# Patient Record
Sex: MALE | Race: WHITE | Employment: FULL TIME | URBAN - NONMETROPOLITAN AREA
[De-identification: names, ages, dates, MRNs, and addresses within clinical notes are randomized per-mention and may not be internally consistent; named-entity substitution may affect disease eponyms.]

---

## 2018-10-08 ENCOUNTER — OFFICE VISIT (OUTPATIENT)
Dept: PRIMARY CARE CLINIC | Age: 28
End: 2018-10-08
Payer: COMMERCIAL

## 2018-10-08 VITALS
DIASTOLIC BLOOD PRESSURE: 60 MMHG | BODY MASS INDEX: 19.59 KG/M2 | SYSTOLIC BLOOD PRESSURE: 102 MMHG | HEART RATE: 50 BPM | WEIGHT: 144.6 LBS | HEIGHT: 72 IN | TEMPERATURE: 97.6 F | OXYGEN SATURATION: 99 %

## 2018-10-08 DIAGNOSIS — J02.9 SORE THROAT: Primary | ICD-10-CM

## 2018-10-08 LAB — S PYO AG THROAT QL: NORMAL

## 2018-10-08 PROCEDURE — G8484 FLU IMMUNIZE NO ADMIN: HCPCS | Performed by: NURSE PRACTITIONER

## 2018-10-08 PROCEDURE — 99203 OFFICE O/P NEW LOW 30 MIN: CPT | Performed by: NURSE PRACTITIONER

## 2018-10-08 PROCEDURE — 87880 STREP A ASSAY W/OPTIC: CPT | Performed by: NURSE PRACTITIONER

## 2018-10-08 PROCEDURE — G8420 CALC BMI NORM PARAMETERS: HCPCS | Performed by: NURSE PRACTITIONER

## 2018-10-08 PROCEDURE — G8427 DOCREV CUR MEDS BY ELIG CLIN: HCPCS | Performed by: NURSE PRACTITIONER

## 2018-10-08 PROCEDURE — 1036F TOBACCO NON-USER: CPT | Performed by: NURSE PRACTITIONER

## 2018-10-08 RX ORDER — CETIRIZINE HYDROCHLORIDE 10 MG/1
10 TABLET ORAL DAILY
COMMUNITY

## 2018-10-08 ASSESSMENT — ENCOUNTER SYMPTOMS
GASTROINTESTINAL NEGATIVE: 1
COUGH: 1

## 2018-10-08 ASSESSMENT — PATIENT HEALTH QUESTIONNAIRE - PHQ9
SUM OF ALL RESPONSES TO PHQ QUESTIONS 1-9: 0
SUM OF ALL RESPONSES TO PHQ9 QUESTIONS 1 & 2: 0
1. LITTLE INTEREST OR PLEASURE IN DOING THINGS: 0
2. FEELING DOWN, DEPRESSED OR HOPELESS: 0
SUM OF ALL RESPONSES TO PHQ QUESTIONS 1-9: 0

## 2019-02-06 ENCOUNTER — OFFICE VISIT (OUTPATIENT)
Dept: FAMILY MEDICINE CLINIC | Age: 29
End: 2019-02-06
Payer: COMMERCIAL

## 2019-02-06 VITALS
WEIGHT: 148.8 LBS | TEMPERATURE: 97.3 F | HEART RATE: 58 BPM | HEIGHT: 72 IN | OXYGEN SATURATION: 98 % | RESPIRATION RATE: 16 BRPM | DIASTOLIC BLOOD PRESSURE: 56 MMHG | BODY MASS INDEX: 20.15 KG/M2 | SYSTOLIC BLOOD PRESSURE: 110 MMHG

## 2019-02-06 DIAGNOSIS — Z23 NEED FOR TDAP VACCINATION: ICD-10-CM

## 2019-02-06 DIAGNOSIS — Z76.89 ENCOUNTER TO ESTABLISH CARE: Primary | ICD-10-CM

## 2019-02-06 DIAGNOSIS — K58.0 IRRITABLE BOWEL SYNDROME WITH DIARRHEA: ICD-10-CM

## 2019-02-06 DIAGNOSIS — Z13.0 SCREENING FOR DEFICIENCY ANEMIA: ICD-10-CM

## 2019-02-06 DIAGNOSIS — R53.83 FATIGUE, UNSPECIFIED TYPE: ICD-10-CM

## 2019-02-06 LAB
BASOPHILS # BLD: 0.1 THOU/MM3
DIFFERENTIAL: AUTOMATED DIFF
EOSINOPHIL # BLD: 0.15 THOU/MM3
HCT VFR BLD CALC: 44.5 %
HEMOGLOBIN: 14.9 G/DL
LYMPHOCYTES # BLD: 2.67 THOU/MM3
MCH RBC QN AUTO: 30.1 PG
MCHC RBC AUTO-ENTMCNC: 33.4 G/DL
MCV RBC AUTO: 90.2 FL
MONOCYTES # BLD: 0.58 THOU/MM3
NEUTROPHILS: 3.73 THOU/MM3
PDW BLD-RTO: 10.9 %
PLATELET # BLD: 238 THOU/MM3
PMV BLD AUTO: 7.4 FL
RBC # BLD: 4.94 M/UL
TSH SERPL DL<=0.05 MIU/L-ACNC: 0.42 MIU/ML
VITAMIN D2, 25 HYDROXY: 40 NG/ML
WBC # BLD: 7.24 THOU/ML3

## 2019-02-06 PROCEDURE — 1036F TOBACCO NON-USER: CPT | Performed by: NURSE PRACTITIONER

## 2019-02-06 PROCEDURE — 90715 TDAP VACCINE 7 YRS/> IM: CPT | Performed by: NURSE PRACTITIONER

## 2019-02-06 PROCEDURE — 90471 IMMUNIZATION ADMIN: CPT | Performed by: NURSE PRACTITIONER

## 2019-02-06 PROCEDURE — G8484 FLU IMMUNIZE NO ADMIN: HCPCS | Performed by: NURSE PRACTITIONER

## 2019-02-06 PROCEDURE — G8420 CALC BMI NORM PARAMETERS: HCPCS | Performed by: NURSE PRACTITIONER

## 2019-02-06 PROCEDURE — 99204 OFFICE O/P NEW MOD 45 MIN: CPT | Performed by: NURSE PRACTITIONER

## 2019-02-06 PROCEDURE — G8427 DOCREV CUR MEDS BY ELIG CLIN: HCPCS | Performed by: NURSE PRACTITIONER

## 2019-02-06 RX ORDER — DICYCLOMINE HCL 20 MG
20 TABLET ORAL 4 TIMES DAILY
Qty: 120 TABLET | Refills: 1 | Status: SHIPPED | OUTPATIENT
Start: 2019-02-06 | End: 2019-05-14 | Stop reason: SDUPTHER

## 2019-02-06 ASSESSMENT — ENCOUNTER SYMPTOMS
DIARRHEA: 1
BLOOD IN STOOL: 0
CONSTIPATION: 0
VOMITING: 1
ABDOMINAL PAIN: 1
NAUSEA: 1

## 2019-02-06 ASSESSMENT — PATIENT HEALTH QUESTIONNAIRE - PHQ9
SUM OF ALL RESPONSES TO PHQ QUESTIONS 1-9: 0
SUM OF ALL RESPONSES TO PHQ QUESTIONS 1-9: 0
2. FEELING DOWN, DEPRESSED OR HOPELESS: 0
1. LITTLE INTEREST OR PLEASURE IN DOING THINGS: 0
SUM OF ALL RESPONSES TO PHQ9 QUESTIONS 1 & 2: 0

## 2019-02-07 ENCOUNTER — TELEPHONE (OUTPATIENT)
Dept: FAMILY MEDICINE CLINIC | Age: 29
End: 2019-02-07

## 2019-02-17 ASSESSMENT — ENCOUNTER SYMPTOMS
WHEEZING: 0
SHORTNESS OF BREATH: 0
COUGH: 0

## 2019-04-30 DIAGNOSIS — R79.89 ABNORMAL TSH: ICD-10-CM

## 2019-04-30 DIAGNOSIS — R53.83 FATIGUE, UNSPECIFIED TYPE: Primary | ICD-10-CM

## 2019-05-03 LAB
T3 TOTAL: NORMAL
T4 FREE: 1.13 NG/DL (ref 0.78–2.1)
TSH SERPL DL<=0.05 MIU/L-ACNC: 1.24 MIU/ML (ref 0.49–4.6)

## 2019-05-14 DIAGNOSIS — K58.0 IRRITABLE BOWEL SYNDROME WITH DIARRHEA: ICD-10-CM

## 2019-05-14 RX ORDER — DICYCLOMINE HCL 20 MG
20 TABLET ORAL 4 TIMES DAILY
Qty: 120 TABLET | Refills: 1 | Status: SHIPPED | OUTPATIENT
Start: 2019-05-14 | End: 2019-07-05 | Stop reason: SDUPTHER

## 2019-06-03 ENCOUNTER — HOSPITAL ENCOUNTER (OUTPATIENT)
Age: 29
Setting detail: SPECIMEN
Discharge: HOME | End: 2019-06-03
Payer: COMMERCIAL

## 2019-06-06 LAB — SURGICAL PATHOLOGY REPORT: NORMAL

## 2019-07-05 DIAGNOSIS — K58.0 IRRITABLE BOWEL SYNDROME WITH DIARRHEA: ICD-10-CM

## 2019-07-05 NOTE — TELEPHONE ENCOUNTER
Angela Thornton is requesting a refill on the following medication(s):  Requested Prescriptions     Pending Prescriptions Disp Refills    dicyclomine (BENTYL) 20 MG tablet 120 tablet 1     Sig: Take 1 tablet by mouth 4 times daily       Last Visit Date (If Applicable):  5/2/7411    Next Visit Date:    Visit date not found

## 2019-07-07 RX ORDER — DICYCLOMINE HCL 20 MG
20 TABLET ORAL 4 TIMES DAILY
Qty: 120 TABLET | Refills: 1 | Status: SHIPPED | OUTPATIENT
Start: 2019-07-07 | End: 2021-11-11

## 2019-09-24 ENCOUNTER — OFFICE VISIT (OUTPATIENT)
Dept: FAMILY MEDICINE CLINIC | Age: 29
End: 2019-09-24
Payer: COMMERCIAL

## 2019-09-24 VITALS
OXYGEN SATURATION: 98 % | BODY MASS INDEX: 21.43 KG/M2 | SYSTOLIC BLOOD PRESSURE: 118 MMHG | HEART RATE: 70 BPM | DIASTOLIC BLOOD PRESSURE: 80 MMHG | WEIGHT: 158 LBS

## 2019-09-24 DIAGNOSIS — M25.562 ACUTE PAIN OF BOTH KNEES: Primary | ICD-10-CM

## 2019-09-24 DIAGNOSIS — K58.0 IRRITABLE BOWEL SYNDROME WITH DIARRHEA: ICD-10-CM

## 2019-09-24 DIAGNOSIS — R21 RASH OF FACE: ICD-10-CM

## 2019-09-24 DIAGNOSIS — M25.561 ACUTE PAIN OF BOTH KNEES: Primary | ICD-10-CM

## 2019-09-24 DIAGNOSIS — R53.83 FATIGUE, UNSPECIFIED TYPE: ICD-10-CM

## 2019-09-24 PROCEDURE — 99214 OFFICE O/P EST MOD 30 MIN: CPT | Performed by: NURSE PRACTITIONER

## 2019-09-24 PROCEDURE — 1036F TOBACCO NON-USER: CPT | Performed by: NURSE PRACTITIONER

## 2019-09-24 PROCEDURE — G8427 DOCREV CUR MEDS BY ELIG CLIN: HCPCS | Performed by: NURSE PRACTITIONER

## 2019-09-24 PROCEDURE — G8420 CALC BMI NORM PARAMETERS: HCPCS | Performed by: NURSE PRACTITIONER

## 2019-09-24 RX ORDER — CHOLESTYRAMINE 4 G/9G
POWDER, FOR SUSPENSION ORAL
Refills: 0 | COMMUNITY
Start: 2019-07-29 | End: 2020-10-13

## 2019-09-24 NOTE — PROGRESS NOTES
[address] [address] [address] [facility]  Dept: [phone]  Dept Fax: [phone]    Miesha Tamayo is a 34 y.o. male who presents today for his medical conditions/complaints as noted below. Miesha Tamayo c/o of Fatigue (extreme fatigue-x2 wks has gotten worse within the last week); Dizziness (lightheadness/lethargic); and Loss of Consciousness (x2 wks ago)      HPI:   Patient presents to the office with complaints of a rash on his nose and cheeks. He is also experiencing bilateral knee pain, without injury. He had dizziness with a syncopal episode 2 weeks ago while going to the bathroom. HR drops to 38 -42 when sleeping, on Apple watch. Knee Pain    The incident occurred more than 1 week ago (2 weeks). There was no injury mechanism. The pain is present in the left knee and right knee. The quality of the pain is described as aching. The pain is at a severity of 5/10. The pain is mild. The pain has been constant since onset. Pertinent negatives include no inability to bear weight, loss of motion, loss of sensation, muscle weakness, numbness or tingling. The symptoms are aggravated by movement. He has tried NSAIDs for the symptoms. The treatment provided no relief. Rash   This is a new problem. The current episode started 1 to 4 weeks ago. The problem has been waxing and waning since onset. The affected locations include the face. The rash is characterized by redness. He was exposed to nothing. Associated symptoms include diarrhea (IBS) and fatigue. Pertinent negatives include no congestion, cough, fever, rhinorrhea, shortness of breath or sore throat. Past treatments include nothing.        BP Readings from Last 3 Encounters:   09/24/19 118/80   02/06/19 (!) 110/56   10/08/18 102/60          ([id number])    Wt Readings from Last 3 Encounters:   09/24/19 158 lb (71.7 kg)   02/06/19 148 lb 12.8 oz (67.5 kg)   10/08/18 144 lb 9.6 oz (65.6 kg)       Past

## 2019-09-27 DIAGNOSIS — M25.561 ACUTE PAIN OF BOTH KNEES: ICD-10-CM

## 2019-09-27 DIAGNOSIS — R53.83 FATIGUE, UNSPECIFIED TYPE: ICD-10-CM

## 2019-09-27 DIAGNOSIS — M25.562 ACUTE PAIN OF BOTH KNEES: ICD-10-CM

## 2019-09-27 DIAGNOSIS — R21 RASH OF FACE: ICD-10-CM

## 2019-09-29 ASSESSMENT — ENCOUNTER SYMPTOMS
RHINORRHEA: 0
SHORTNESS OF BREATH: 0
ABDOMINAL PAIN: 0
COUGH: 0
CONSTIPATION: 1
SINUS PRESSURE: 0
SORE THROAT: 0
WHEEZING: 0
EYES NEGATIVE: 1
DIARRHEA: 1

## 2019-10-02 DIAGNOSIS — R53.83 FATIGUE, UNSPECIFIED TYPE: ICD-10-CM

## 2019-10-02 DIAGNOSIS — M25.562 ACUTE PAIN OF BOTH KNEES: Primary | ICD-10-CM

## 2019-10-02 DIAGNOSIS — M25.562 ACUTE PAIN OF BOTH KNEES: ICD-10-CM

## 2019-10-02 DIAGNOSIS — R21 RASH OF FACE: ICD-10-CM

## 2019-10-02 DIAGNOSIS — M25.561 ACUTE PAIN OF BOTH KNEES: Primary | ICD-10-CM

## 2019-10-02 DIAGNOSIS — M25.561 ACUTE PAIN OF BOTH KNEES: ICD-10-CM

## 2019-10-02 RX ORDER — NAPROXEN 500 MG/1
500 TABLET ORAL 2 TIMES DAILY WITH MEALS
Qty: 30 TABLET | Refills: 1 | Status: SHIPPED | OUTPATIENT
Start: 2019-10-02 | End: 2022-08-08

## 2020-10-13 ENCOUNTER — OFFICE VISIT (OUTPATIENT)
Dept: FAMILY MEDICINE CLINIC | Age: 30
End: 2020-10-13
Payer: COMMERCIAL

## 2020-10-13 VITALS
HEART RATE: 85 BPM | RESPIRATION RATE: 16 BRPM | WEIGHT: 154 LBS | DIASTOLIC BLOOD PRESSURE: 80 MMHG | TEMPERATURE: 97.5 F | OXYGEN SATURATION: 96 % | SYSTOLIC BLOOD PRESSURE: 118 MMHG | BODY MASS INDEX: 20.89 KG/M2

## 2020-10-13 PROCEDURE — G8427 DOCREV CUR MEDS BY ELIG CLIN: HCPCS | Performed by: NURSE PRACTITIONER

## 2020-10-13 PROCEDURE — G8484 FLU IMMUNIZE NO ADMIN: HCPCS | Performed by: NURSE PRACTITIONER

## 2020-10-13 PROCEDURE — G8420 CALC BMI NORM PARAMETERS: HCPCS | Performed by: NURSE PRACTITIONER

## 2020-10-13 PROCEDURE — 99214 OFFICE O/P EST MOD 30 MIN: CPT | Performed by: NURSE PRACTITIONER

## 2020-10-13 PROCEDURE — 1036F TOBACCO NON-USER: CPT | Performed by: NURSE PRACTITIONER

## 2020-10-13 RX ORDER — METRONIDAZOLE 7.5 MG/G
GEL TOPICAL
Qty: 60 G | Refills: 0 | Status: SHIPPED | OUTPATIENT
Start: 2020-10-13 | End: 2020-10-20 | Stop reason: ALTCHOICE

## 2020-10-13 ASSESSMENT — PATIENT HEALTH QUESTIONNAIRE - PHQ9
SUM OF ALL RESPONSES TO PHQ QUESTIONS 1-9: 0
2. FEELING DOWN, DEPRESSED OR HOPELESS: 0
SUM OF ALL RESPONSES TO PHQ QUESTIONS 1-9: 0
1. LITTLE INTEREST OR PLEASURE IN DOING THINGS: 0
SUM OF ALL RESPONSES TO PHQ9 QUESTIONS 1 & 2: 0

## 2020-10-13 ASSESSMENT — ENCOUNTER SYMPTOMS
CHANGE IN BOWEL HABIT: 1
ABDOMINAL PAIN: 1
ANAL BLEEDING: 0
NAUSEA: 1
VOMITING: 0

## 2020-10-13 NOTE — PROGRESS NOTES
[address] [place] [address] [address], [facility]  [address]  Phone:  [phone]  Fax:  [phone]  Jesse Serrato is a 27 y.o. male who presents today for his medical conditions/complaints as noted below. Jesse Serrato c/o of Lesion(s) (chest/abdomen past 3-4 days)      HPI:     Other   This is a recurrent problem. The current episode started in the past 7 days (Has been having issues for several years now. Will be fine for months and then has flares. ). The problem has been gradually worsening. Associated symptoms include abdominal pain (substernal), anorexia, arthralgias (hand joints and knees), a change in bowel habit (diarrhea), fatigue, nausea and a rash (facial). Pertinent negatives include no chills, fever or vomiting. Nothing aggravates the symptoms. Treatments tried: viberzi - caused too much constipation, bentyl. The treatment provided no relief. Wt Readings from Last 3 Encounters:   10/13/20 154 lb (69.9 kg)   09/24/19 158 lb (71.7 kg)   02/06/19 148 lb 12.8 oz (67.5 kg)       Temp Readings from Last 3 Encounters:   10/13/20 97.5 °F (36.4 °C)   02/06/19 97.3 °F (36.3 °C) (Tympanic)   10/08/18 97.6 °F (36.4 °C) (Tympanic)       BP Readings from Last 3 Encounters:   10/13/20 118/80   09/24/19 118/80   02/06/19 (!) 110/56       Pulse Readings from Last 3 Encounters:   10/13/20 85   09/24/19 70   02/06/19 58              Past Medical History:   Diagnosis Date    Irritable bowel syndrome     GI doctor in [place] was treating IBS      History reviewed. No pertinent surgical history.   Family History   Problem Relation Age of Onset   [de-identified] Cancer Father         brain cancer in remission ([id number])    Other Father         ruptured gall bladder had removed    Diabetes Maternal Grandfather     Heart Attack Paternal Grandfather 80     Social History     Tobacco Use    Smoking status: Never Smoker    Smokeless tobacco: Never Used   Substance Use Topics    Alcohol use: Yes     Comment: occasional      Current Outpatient Medications   Medication Sig Dispense Refill    metroNIDAZOLE (METROGEL) 0.75 % gel Apply topically 2 times daily. 60 g 0    naproxen (NAPROSYN) 500 MG tablet Take 1 tablet by mouth 2 times daily (with meals) 30 tablet 1    dicyclomine (BENTYL) 20 MG tablet Take 1 tablet by mouth 4 times daily 120 tablet 1    cetirizine (ZYRTEC) 10 MG tablet Take 10 mg by mouth daily      Budesonide (RHINOCORT ALLERGY NA) 1 spray by Nasal route 2 times daily       No current facility-administered medications for this visit. No Known Allergies    No exam data present    Subjective:      Review of Systems   Constitutional: Positive for fatigue. Negative for chills and fever. Gastrointestinal: Positive for abdominal pain (substernal), anorexia, change in bowel habit (diarrhea) and nausea. Negative for anal bleeding and vomiting. Musculoskeletal: Positive for arthralgias (hand joints and knees). Skin: Positive for rash (facial). Psychiatric/Behavioral:        Complains of \"brain fog\"         Objective:     /80 (Site: Right Upper Arm, Position: Sitting, Cuff Size: Medium Adult)   Pulse 85   Temp 97.5 °F (36.4 °C)   Resp 16   Wt 154 lb (69.9 kg)   SpO2 96%   BMI 20.89 kg/m²     Physical Exam  Vitals signs reviewed. Constitutional:       General: He is not in acute distress. Appearance: He is well-developed. He is not ill-appearing, toxic-appearing or diaphoretic. HENT:      Head: Normocephalic. Right Ear: Tympanic membrane, ear canal and external ear normal.      Left Ear: Tympanic membrane, ear canal and external ear normal.      Nose: Nose normal. No mucosal edema, congestion or rhinorrhea. Mouth/Throat:      Mouth: Mucous membranes are moist. Mucous membranes are not pale and not dry. Pharynx: Oropharynx is clear. Eyes:      General: Lids are normal. No scleral icterus. Right eye: No discharge.          Left eye: No discharge. Extraocular Movements:      Right eye: No nystagmus. Left eye: No nystagmus. Conjunctiva/sclera: Conjunctivae normal.      Pupils: Pupils are equal, round, and reactive to light. Neck:      Musculoskeletal: Full passive range of motion without pain and normal range of motion. Trachea: Trachea normal.   Cardiovascular:      Rate and Rhythm: Normal rate and regular rhythm. Heart sounds: Normal heart sounds. Pulmonary:      Effort: Pulmonary effort is normal. No accessory muscle usage or respiratory distress. Breath sounds: Normal breath sounds. Abdominal:      General: Abdomen is flat. Bowel sounds are normal.      Tenderness: There is abdominal tenderness. Musculoskeletal: Normal range of motion. Skin:     General: Skin is warm and dry. Capillary Refill: Capillary refill takes less than 2 seconds. Coloration: Skin is not pale. Findings: Rash present. Neurological:      Mental Status: He is alert and oriented to person, place, and time. Psychiatric:         Mood and Affect: Mood normal.         Speech: Speech normal.         Behavior: Behavior normal. Behavior is cooperative. Thought Content: Thought content normal.         Judgment: Judgment normal.         Assessment:      Diagnosis Orders   1. Gastric pain  Sedimentation Rate    C-Reactive Protein    Celiac Disease Panel    CBC With Auto Differential   2. Arthralgia of both hands  Sedimentation Rate    C-Reactive Protein    Celiac Disease Panel    CBC With Auto Differential   3. Rosacea  metroNIDAZOLE (METROGEL) 0.75 % gel     No results found for this visit on 10/13/20. Plan:         Eat high wheat diet for 2 days and then have labs done. Start the Metrogel. If too expensive check if the cream is cheaper. If so I can change the prescription. I will call you with results.         Patient Instructions     Eat high wheat diet for 2 days and then have labs done.  Start the Metrogel. If too expensive check if the cream is cheaper. If so I can change the prescription. I will call you with results. Patient Education        Rosacea: Care Instructions  Your Care Instructions  Rosacea (say \"Martin\") is a skin condition that can cause redness, pimples, and red lines on the nose, cheeks, chin, and forehead. It is often mistaken for acne because it can cause outbreaks with bumps like pimples. Rosacea can also cause burning and soreness in your eyes. Rosacea is usually controlled by using medicine and avoiding alcohol, the sun, and other things that can make rosacea worse. Your doctor may have prescribed medicines or other treatment. If antibiotics do not control the rosacea, your doctor may try other medicines. Follow-up care is a key part of your treatment and safety. Be sure to make and go to all appointments, and call your doctor if you are having problems. It's also a good idea to know your test results and keep a list of the medicines you take. How can you care for yourself at home? · Take your medicines exactly as prescribed. Call your doctor if you think you are having a problem with your medicine. · Protect your face from the sun by wearing hats with wide brims and sunglasses. Try to stay out of the sun or find shade if you need to be outdoors. Use a sunscreen for sensitive skin with an SPF of 30 or higher on any exposed skin. · Use soaps, lotions, and makeup made for sensitive skin or rosacea. These do not contain alcohol, are not abrasive, and will not clog pores. · There are over-the-counter skin care products available that are specifically for people with rosacea. These products can help mask facial redness without irritating your skin. · Avoid rubbing or scrubbing your face. · If you have rosacea on your eyelids, put a warm, wet towel, or compress, on your eyes several times a day.  Gently wash your eyelids with a washcloth or an eyelid cleanser that is sold in drugstores. Use artificial tears if your eyes feel dry. · Make a list or keep a diary of things that may trigger your rosacea. Use the diary every day for several weeks. Avoid whatever you find that makes your rosacea worse. These triggers may include:  ? Harsh weather. Wear a hat and scarf to shield your face from the cold and wind. Use a moisturizer during the winter to keep your face moist.  ? Stress. Eat a healthy diet and get plenty of exercise and sleep. ? Alcohol, spicy foods, or hot drinks. Avoid or limit these if they make your rosacea worse. ? Getting too hot when you exercise. Try working out for a shorter time. In the summer, exercise during the cool morning hours. ? Hot showers. Take warm or cool showers and avoid hot tubs and saunas. When should you call for help? Watch closely for changes in your health, and be sure to contact your doctor if:    · You do not get better as expected. Where can you learn more? Go to https://MD Revolution.[employer]. org and sign in to your Dot VN account. Enter B905 in the Verbling box to learn more about \"Rosacea: Care Instructions. \"     If you do not have an account, please click on the \"Sign Up Now\" link. Current as of: July 2, 2020               Content Version: 12.6  © 3519-4895 [employer]. Care instructions adapted under license by [facility] ([facility]). If you have questions about a medical condition or this instruction, always ask your healthcare professional. [address] [address] any warranty or liability for your use of this information. Patient Education        Celiac Disease: Care Instructions  Your Care Instructions  Celiac disease (or celiac sprue) is a problem with digesting gluten. Gluten is a type of protein found in wheat, rye, and other grains. This problem starts when the body's immune system attacks the small intestine when gluten is eaten.  The immune system is supposed to fight off viruses and other invaders, but sometimes it turns on the person's own body. (This is called an autoimmune disease.) Celiac disease seems to run in families. Celiac disease causes damage to the small intestine. This makes it hard for the body to absorb vitamins and other nutrients. You cannot prevent celiac disease. But you can stop and reverse the damage to the small intestine by eating a strict gluten-free diet. Follow-up care is a key part of your treatment and safety. Be sure to make and go to all appointments, and call your doctor if you are having problems. It's also a good idea to know your test results and keep a list of the medicines you take. How can you care for yourself at home? · Eat a gluten-free diet to prevent symptoms and damage to the small intestine. Even a small amount of gluten may cause damage. ? Avoid all foods that contain wheat, rye, and barley gluten. Bread, bagels, pasta, pizza, malted breakfast cereals, and crackers are all examples of foods that contain gluten. ? Avoid oats, at least at first. Oats may cause symptoms in some people. The oats may be contaminated with wheat, barley, or rye from processing. But many people who have celiac disease can eat moderate amounts of oats without having symptoms. Health professionals vary in their long-term recommendations regarding eating foods with oats. But most agree it is safe to eat oats labeled as gluten-free. · You may need to avoid milk and milk products for a while. Once you stop eating any gluten, the intestine will begin to heal. Then it should be okay to drink milk and eat milk products. · Read food labels carefully and look for hidden gluten, such as gluten in medicine and some food additives. If a label says \"modified food starch,\" the product may contain gluten. · Plan your diet around:  ? Eggs. ? Dairy products, if you can eat them.  Cheese, yogurt, and other dairy products can be an important part of the diet. ? Flours and foods made with amaranth, arrowroot, beans, buckwheat, corn, cornmeal, flax, millet, potatoes, gluten-free nut and oat bran, quinoa, rice, sorghum, soybeans, tapioca, or teff. ? Fresh, frozen, and canned meats, fruits, and vegetables. Watch for added gluten. · Talk to your doctor or contact your local hospital or dietitian for information about support groups in your area. You may find a support group helpful for discovering ways to help you deal with celiac disease. Celiac disease support groups often share recipes and good food sources. · Look for gluten-free foods. Many food stores, especially health food stores, offer specially marked gluten-free food. When should you call for help? Watch closely for changes in your health, and be sure to contact your doctor if:    · Your bloating, gas, and diarrhea get worse.     · You have bloating, gas, and diarrhea after not having them for a while. Where can you learn more? Go to https://MobibasepeCRAZE.Genoom. org and sign in to your Everlane account. Enter [id number] in the [facility] box to learn more about \"Celiac Disease: Care Instructions. \"     If you do not have an account, please click on the \"Sign Up Now\" link. Current as of: April 15, 2020               Content Version: 12.6  © 2006-2020 [employer]. Care instructions adapted under license by [facility] ([facility]). If you have questions about a medical condition or this instruction, always ask your healthcare professional. [address] [address] any warranty or liability for your use of this information. Patient Education        Gluten-Free Diet: Care Instructions  Your Care Instructions     To help your symptoms, your doctor has recommended a gluten-free diet. This means not eating foods that have gluten in them. Gluten is a kind of protein. It's found in wheat, barley, and rye.   If you eat a gluten-free diet, you can help manage your symptoms and prevent long-term problems. You can also get all the nutrition you need. Follow-up care is a key part of your treatment and safety. Be sure to make and go to all appointments, and call your doctor if you are having problems. It's also a good idea to know your test results and keep a list of the medicines you take. How can you care for yourself at home? · Don't eat any foods that have gluten in them. These include bagels, bread, crackers, and some cereals. They also include pasta and pizza. · Carefully read food labels. Look for wheat or wheat products in ice cream and candy. You may also find them in salad dressing, canned and frozen soups and vegetables, and other processed foods. · Avoid all beer products unless the label says they are gluten-free. Beers with and without alcohol have gluten unless the labels say they are gluten-free. This includes lagers, ales, and stouts. · Avoid oats, at least at first. Oats may cause symptoms in some people, perhaps as a result of contamination with wheat, barley, or rye during processing. But many people who have celiac disease can eat moderate amounts of oats without having symptoms. Health professionals vary in their long-term recommendations regarding eating foods with oats. But most agree it is safe to eat oats labeled as gluten-free. · When you eat out, look for restaurants that serve gluten-free food. You can also ask if the  is familiar with gluten-free cooking. · Try to learn more about gluten-free options. Find grocery stores that sell gluten-free pizza and other foods. If you have access to the Internet, look online for gluten-free foods and recipes. · On a gluten-free eating plan, it's okay to have:  ? Eggs and dairy products. (But some dairy products may make your symptoms worse. Ask your doctor if you have questions about dairy products. Read ingredient labels carefully. Some processed cheeses contain gluten.)  ?  Flours and foods made with amaranth, arrowroot, beans, buckwheat, corn, cornmeal, flax, millet, potatoes, gluten-free nut and oat bran, quinoa, rice, sorghum, soybeans, tapioca, or teff. ? Fresh, frozen, or canned unprocessed meats. But avoid processed meats. Some examples of processed meats to avoid are hot dogs, salami, and deli meat. Read labels for additives that may contain gluten. ? Fresh, frozen, dried, or canned fruits and vegetables, if they do not have thickeners or other additives that contain gluten. ? Some alcohol drinks. These include wine, liqueurs, and ciders. They also include liquor like whiskey and letitia. When should you call for help? Watch closely for changes in your health, and be sure to contact your doctor if:    · You have unexplained weight loss.     · You have diarrhea that lasts longer than 1 to 2 weeks.     · You have unusual fatigue or mood changes, especially if these last more than a week and are not related to any other illness, such as the flu.     · Your symptoms come back again.     · Your stomach pain gets worse. Where can you learn more? Go to https://Pro Player Connect.eTukTuk. org and sign in to your Visual Realm account. Enter [phone] in the [facility] box to learn more about \"Gluten-Free Diet: Care Instructions. \"     If you do not have an account, please click on the \"Sign Up Now\" link. Current as of: August 22, 2019               Content Version: 12.6  © 6020-9001 [employer]. Care instructions adapted under license by [facility] ([facility]). If you have questions about a medical condition or this instruction, always ask your healthcare professional. [address] [address] any warranty or liability for your use of this information. Patient/Caregiver instructed on use, benefit, and side effects of prescribed medications. All patient/parent/caregiver questions answered. Patient/parent/caregiver voiced understanding. Reviewed health maintenance.   Instructed to continue current medications, diet and exercise. Patient agreed with treatment plan. Follow up as directed.            Electronically signed by VINCE Mathews NP on10/13/2020

## 2020-10-13 NOTE — PATIENT INSTRUCTIONS
Eat high wheat diet for 2 days and then have labs done. Start the Metrogel. If too expensive check if the cream is cheaper. If so I can change the prescription. I will call you with results. Patient Education        Rosacea: Care Instructions  Your Care Instructions  Rosacea (say \"Martin\") is a skin condition that can cause redness, pimples, and red lines on the nose, cheeks, chin, and forehead. It is often mistaken for acne because it can cause outbreaks with bumps like pimples. Rosacea can also cause burning and soreness in your eyes. Rosacea is usually controlled by using medicine and avoiding alcohol, the sun, and other things that can make rosacea worse. Your doctor may have prescribed medicines or other treatment. If antibiotics do not control the rosacea, your doctor may try other medicines. Follow-up care is a key part of your treatment and safety. Be sure to make and go to all appointments, and call your doctor if you are having problems. It's also a good idea to know your test results and keep a list of the medicines you take. How can you care for yourself at home? · Take your medicines exactly as prescribed. Call your doctor if you think you are having a problem with your medicine. · Protect your face from the sun by wearing hats with wide brims and sunglasses. Try to stay out of the sun or find shade if you need to be outdoors. Use a sunscreen for sensitive skin with an SPF of 30 or higher on any exposed skin. · Use soaps, lotions, and makeup made for sensitive skin or rosacea. These do not contain alcohol, are not abrasive, and will not clog pores. · There are over-the-counter skin care products available that are specifically for people with rosacea. These products can help mask facial redness without irritating your skin. · Avoid rubbing or scrubbing your face. · If you have rosacea on your eyelids, put a warm, wet towel, or compress, on your eyes several times a day.  Gently wash your eyelids with a washcloth or an eyelid cleanser that is sold in drugstores. Use artificial tears if your eyes feel dry. · Make a list or keep a diary of things that may trigger your rosacea. Use the diary every day for several weeks. Avoid whatever you find that makes your rosacea worse. These triggers may include:  ? Harsh weather. Wear a hat and scarf to shield your face from the cold and wind. Use a moisturizer during the winter to keep your face moist.  ? Stress. Eat a healthy diet and get plenty of exercise and sleep. ? Alcohol, spicy foods, or hot drinks. Avoid or limit these if they make your rosacea worse. ? Getting too hot when you exercise. Try working out for a shorter time. In the summer, exercise during the cool morning hours. ? Hot showers. Take warm or cool showers and avoid hot tubs and saunas. When should you call for help? Watch closely for changes in your health, and be sure to contact your doctor if:    · You do not get better as expected. Where can you learn more? Go to https://BackOffice Associates.Recognition PRO. org and sign in to your Upstream account. Enter S835 in the [facility] box to learn more about \"Rosacea: Care Instructions. \"     If you do not have an account, please click on the \"Sign Up Now\" link. Current as of: July 2, 2020               Content Version: 12.6  © 8292-8848 Healthwise, Incorporated. Care instructions adapted under license by [facility] ([facility]). If you have questions about a medical condition or this instruction, always ask your healthcare professional. [place] [address] any warranty or liability for your use of this information. Patient Education        Celiac Disease: Care Instructions  Your Care Instructions  Celiac disease (or celiac sprue) is a problem with digesting gluten. Gluten is a type of protein found in wheat, rye, and other grains.  This problem starts when the body's immune system attacks the small intestine when gluten is eaten. The immune system is supposed to fight off viruses and other invaders, but sometimes it turns on the person's own body. (This is called an autoimmune disease.) Celiac disease seems to run in families. Celiac disease causes damage to the small intestine. This makes it hard for the body to absorb vitamins and other nutrients. You cannot prevent celiac disease. But you can stop and reverse the damage to the small intestine by eating a strict gluten-free diet. Follow-up care is a key part of your treatment and safety. Be sure to make and go to all appointments, and call your doctor if you are having problems. It's also a good idea to know your test results and keep a list of the medicines you take. How can you care for yourself at home? · Eat a gluten-free diet to prevent symptoms and damage to the small intestine. Even a small amount of gluten may cause damage. ? Avoid all foods that contain wheat, rye, and barley gluten. Bread, bagels, pasta, pizza, malted breakfast cereals, and crackers are all examples of foods that contain gluten. ? Avoid oats, at least at first. Oats may cause symptoms in some people. The oats may be contaminated with wheat, barley, or rye from processing. But many people who have celiac disease can eat moderate amounts of oats without having symptoms. Health professionals vary in their long-term recommendations regarding eating foods with oats. But most agree it is safe to eat oats labeled as gluten-free. · You may need to avoid milk and milk products for a while. Once you stop eating any gluten, the intestine will begin to heal. Then it should be okay to drink milk and eat milk products. · Read food labels carefully and look for hidden gluten, such as gluten in medicine and some food additives. If a label says \"modified food starch,\" the product may contain gluten. · Plan your diet around:  ? Eggs. ? Dairy products, if you can eat them.  Cheese, yogurt, and other dairy products can be an important part of the diet. ? Flours and foods made with amaranth, arrowroot, beans, buckwheat, corn, cornmeal, flax, millet, potatoes, gluten-free nut and oat bran, quinoa, rice, sorghum, soybeans, tapioca, or teff. ? Fresh, frozen, and canned meats, fruits, and vegetables. Watch for added gluten. · Talk to your doctor or contact your local hospital or dietitian for information about support groups in your area. You may find a support group helpful for discovering ways to help you deal with celiac disease. Celiac disease support groups often share recipes and good food sources. · Look for gluten-free foods. Many food stores, especially health food stores, offer specially marked gluten-free food. When should you call for help? Watch closely for changes in your health, and be sure to contact your doctor if:    · Your bloating, gas, and diarrhea get worse.     · You have bloating, gas, and diarrhea after not having them for a while. Where can you learn more? Go to https://StudyplacespepicGlobitel.Pixium Vision. org and sign in to your GigaTrust account. Enter [id number] in the [facility] box to learn more about \"Celiac Disease: Care Instructions. \"     If you do not have an account, please click on the \"Sign Up Now\" link. Current as of: April 15, 2020               Content Version: 12.6  © 1228-6726 [employer]. Care instructions adapted under license by [facility] ([facility]). If you have questions about a medical condition or this instruction, always ask your healthcare professional. [place] [address] any warranty or liability for your use of this information. Patient Education        Gluten-Free Diet: Care Instructions  Your Care Instructions     To help your symptoms, your doctor has recommended a gluten-free diet. This means not eating foods that have gluten in them. Gluten is a kind of protein. It's found in wheat, barley, and rye.   If you eat a contain gluten.)  ? Flours and foods made with amaranth, arrowroot, beans, buckwheat, corn, cornmeal, flax, millet, potatoes, gluten-free nut and oat bran, quinoa, rice, sorghum, soybeans, tapioca, or teff. ? Fresh, frozen, or canned unprocessed meats. But avoid processed meats. Some examples of processed meats to avoid are hot dogs, salami, and deli meat. Read labels for additives that may contain gluten. ? Fresh, frozen, dried, or canned fruits and vegetables, if they do not have thickeners or other additives that contain gluten. ? Some alcohol drinks. These include wine, liqueurs, and ciders. They also include liquor like whiskey and letitia. When should you call for help? Watch closely for changes in your health, and be sure to contact your doctor if:    · You have unexplained weight loss.     · You have diarrhea that lasts longer than 1 to 2 weeks.     · You have unusual fatigue or mood changes, especially if these last more than a week and are not related to any other illness, such as the flu.     · Your symptoms come back again.     · Your stomach pain gets worse. Where can you learn more? Go to https://Zyncro.ADS-B Technologies. org and sign in to your CityHook account. Enter [phone] in the [facility] box to learn more about \"Gluten-Free Diet: Care Instructions. \"     If you do not have an account, please click on the \"Sign Up Now\" link. Current as of: August 22, 2019               Content Version: 12.6  © 8752-3398 App[employer]ek, Incorporated. Care instructions adapted under license by [facility] ([facility]). If you have questions about a medical condition or this instruction, always ask your healthcare professional. [place] [address] any warranty or liability for your use of this information.

## 2020-10-16 LAB
BASOPHILS %: 1.48 (ref 0–3)
BASOPHILS ABSOLUTE: 0.15 (ref 0–0.3)
C-REACTIVE PROTEIN: < 0.5 MG/DL (ref 0–1)
EOSINOPHILS %: 1.24 (ref 0–10)
EOSINOPHILS ABSOLUTE: 0.12 (ref 0–1.1)
HCT VFR BLD CALC: 42.9 % (ref 42–52)
HEMOGLOBIN: 15.1 (ref 13.8–17.8)
LYMPHOCYTE %: 18.27 (ref 20–51.1)
LYMPHOCYTES ABSOLUTE: 1.81 (ref 1–5.5)
MCH RBC QN AUTO: 32 PG (ref 28.5–32.5)
MCHC RBC AUTO-ENTMCNC: 35.3 G/DL (ref 32–37)
MCV RBC AUTO: 90.8 FL (ref 80–94)
MONOCYTES %: 6.74 (ref 1.7–9.3)
MONOCYTES ABSOLUTE: 0.67 (ref 0.1–1)
NEUTROPHILS %: 72.28 (ref 42.2–75.2)
NEUTROPHILS ABSOLUTE: 7.18 (ref 2–8.1)
PDW BLD-RTO: 10.7 % (ref 10–15.5)
PLATELET # BLD: 218 THOU/MM3 (ref 130–400)
RBC: 4.73 M/UL (ref 4.7–6.1)
SEDIMENTATION RATE, ERYTHROCYTE: 1 MM/HR (ref 0–20)
WBC: 9.9 THOU/ML3 (ref 4.8–10.8)

## 2020-10-20 LAB
GLIADIN DEAMINIDATED PEPTIDE AB IGA: 0.3 U/ML
GLIADIN DEAMINIDATED PEPTIDE AB IGG: 1 U/ML
IGA: 148 MG/DL (ref 70–400)
TISSUE TRANSGLUTAMINASE ANTIBODY IGG: 1.8 U/ML
TISSUE TRANSGLUTAMINASE IGA: <0.1 U/ML

## 2021-11-11 ENCOUNTER — OFFICE VISIT (OUTPATIENT)
Dept: FAMILY MEDICINE CLINIC | Age: 31
End: 2021-11-11
Payer: COMMERCIAL

## 2021-11-11 VITALS
DIASTOLIC BLOOD PRESSURE: 94 MMHG | HEART RATE: 99 BPM | BODY MASS INDEX: 20.55 KG/M2 | WEIGHT: 151.5 LBS | OXYGEN SATURATION: 97 % | SYSTOLIC BLOOD PRESSURE: 140 MMHG

## 2021-11-11 DIAGNOSIS — M25.50 CHRONIC PAIN OF MULTIPLE JOINTS: Primary | ICD-10-CM

## 2021-11-11 DIAGNOSIS — G89.29 CHRONIC PAIN OF MULTIPLE JOINTS: Primary | ICD-10-CM

## 2021-11-11 DIAGNOSIS — R53.83 FATIGUE, UNSPECIFIED TYPE: ICD-10-CM

## 2021-11-11 DIAGNOSIS — R21 BUTTERFLY RASH: ICD-10-CM

## 2021-11-11 LAB
ALBUMIN/GLOBULIN RATIO: 1.5 G/DL
ALBUMIN: 5.4 G/DL (ref 3.5–5)
ALP BLD-CCNC: 59 UNITS/L (ref 38–126)
ALT SERPL-CCNC: 16 UNITS/L (ref 4–50)
ANION GAP SERPL CALCULATED.3IONS-SCNC: 11.7 MMOL/L
AST SERPL-CCNC: 24 UNITS/L (ref 17–59)
BASOPHILS %: 1.5 (ref 0–3)
BASOPHILS ABSOLUTE: 0.16 (ref 0–0.3)
BILIRUB SERPL-MCNC: 0.7 MG/DL (ref 0.2–1.3)
BUN BLDV-MCNC: 10 MG/DL (ref 9–20)
CALCIUM SERPL-MCNC: 10.3 MG/DL (ref 8.4–10.2)
CHLORIDE BLD-SCNC: 101 MMOL/L (ref 98–120)
CO2: 28 MMOL/L (ref 22–31)
CREAT SERPL-MCNC: 0.9 MG/DL (ref 0.7–1.3)
EOSINOPHILS %: 0.79 (ref 0–10)
EOSINOPHILS ABSOLUTE: 0.08 (ref 0–1.1)
GFR CALCULATED: > 60
GLOBULIN: 3.6 G/DL
GLUCOSE: 89 MG/DL (ref 75–110)
HCT VFR BLD CALC: 43.6 % (ref 42–52)
HEMOGLOBIN: 16 (ref 13.8–17.8)
LYMPHOCYTE %: 13.66 (ref 20–51.1)
LYMPHOCYTES ABSOLUTE: 1.42 (ref 1–5.5)
MCH RBC QN AUTO: 31.8 PG (ref 28.5–32.5)
MCHC RBC AUTO-ENTMCNC: 36.8 G/DL (ref 32–37)
MCV RBC AUTO: 86.5 FL (ref 80–94)
MONOCYTES %: 7.68 (ref 1.7–9.3)
MONOCYTES ABSOLUTE: 0.8 (ref 0.1–1)
NEUTROPHILS %: 76.36 (ref 42.2–75.2)
NEUTROPHILS ABSOLUTE: 7.93 (ref 2–8.1)
PDW BLD-RTO: 10.5 % (ref 10–15.5)
PLATELET # BLD: 248.6 THOU/MM3 (ref 130–400)
POTASSIUM SERPL-SCNC: 4.3 MMOL/L (ref 3.6–5)
RBC: 5.04 M/UL (ref 4.7–6.1)
SEDIMENTATION RATE, ERYTHROCYTE: 3 MM/HR (ref 0–20)
SODIUM BLD-SCNC: 140 MMOL/L (ref 135–145)
T4 FREE: 1.17 NG/DL (ref 0.78–2.19)
TOTAL PROTEIN, SERUM: 9 G/DL (ref 6.3–8.2)
TSH REFLEX FT4: 0.38 MIU/ML (ref 0.49–4.67)
VITAMIN D 25-HYDROXY: 52.2 NG/ML (ref 30–100)
WBC: 10.4 THOU/ML3 (ref 4.8–10.8)

## 2021-11-11 PROCEDURE — G8427 DOCREV CUR MEDS BY ELIG CLIN: HCPCS | Performed by: NURSE PRACTITIONER

## 2021-11-11 PROCEDURE — G8420 CALC BMI NORM PARAMETERS: HCPCS | Performed by: NURSE PRACTITIONER

## 2021-11-11 PROCEDURE — 99214 OFFICE O/P EST MOD 30 MIN: CPT | Performed by: NURSE PRACTITIONER

## 2021-11-11 PROCEDURE — 1036F TOBACCO NON-USER: CPT | Performed by: NURSE PRACTITIONER

## 2021-11-11 PROCEDURE — G8484 FLU IMMUNIZE NO ADMIN: HCPCS | Performed by: NURSE PRACTITIONER

## 2021-11-11 RX ORDER — DULOXETIN HYDROCHLORIDE 30 MG/1
30 CAPSULE, DELAYED RELEASE ORAL DAILY
Qty: 30 CAPSULE | Refills: 2 | Status: SHIPPED | OUTPATIENT
Start: 2021-11-11 | End: 2021-11-22

## 2021-11-11 NOTE — PROGRESS NOTES
[address] [address] [address]  Dept: [phone]  Dept Fax: [phone]    History and Physical  Patient:  Lesly Fields  YOB: 1990  Date of Service:  2021    Subjective:   Lesly Fields (:  1990) is a 32 y.o. male, Established patient, here for evaluation of the following chief complaint(s):    Chief Complaint   Patient presents with    Fatigue     reports feels like completely drained, has some heaviness feeling, joint pain all over, has some skin discoloration along bridge of nose itcy burning sensation       HPI  Patient reports feeling extreme fatigue, stiff, and generalized aches. He complains of bilateral knee, elbow, and hip pain. He also reports having a rash on his face. His appetite has been decreased and he has been losing weight steadily. Family history is significant for rheumatoid arthritis in his grandfather. He denies fever, chills. Fatigue  This is a chronic problem. The current episode started more than 1 year ago. The problem occurs intermittently. The problem has been gradually worsening. Associated symptoms include arthralgias (miltiple joint pain), fatigue and a rash (nose and cheeks). Pertinent negatives include no abdominal pain, chest pain, chills, coughing, fever, headaches, myalgias, nausea, sore throat or swollen glands. Nothing aggravates the symptoms. He has tried rest, sleep, position changes and relaxation for the symptoms. The treatment provided no relief. The ASCVD Risk score (Jolene Lew, et al., 2013) failed to calculate for the following reasons:     The 2013 ASCVD risk score is only valid for ages 36 to 78     BP Readings from Last 3 Encounters:   21 (!) 140/94   10/13/20 118/80   19 118/80      Pulse Readings from Last 3 Encounters:   21 99   10/13/20 85   19 70      Wt Readings from Last 3 Encounters:   21 151 lb 8 oz (68.7 kg) 10/13/20 154 lb (69.9 kg)   09/24/19 158 lb (71.7 kg)        Allergies   Allergen Reactions    Pecan Nut (Diagnostic) Itching, Rash and Shortness Of Breath       Current Outpatient Medications   Medication Sig Dispense Refill    DULoxetine (CYMBALTA) 30 MG extended release capsule Take 1 capsule by mouth daily 30 capsule 2    metroNIDAZOLE (METROCREAM) 0.75 % cream Apply topically 2 times daily. 60 g 0    naproxen (NAPROSYN) 500 MG tablet Take 1 tablet by mouth 2 times daily (with meals) 30 tablet 1    cetirizine (ZYRTEC) 10 MG tablet Take 10 mg by mouth daily      Budesonide (RHINOCORT ALLERGY NA) 1 spray by Nasal route 2 times daily       No current facility-administered medications for this visit. Past Medical History:   Diagnosis Date    Irritable bowel syndrome     GI doctor in [place] was treating IBS       No past surgical history on file. Family History   Problem Relation Age of Onset   [employer] Cancer Father         brain cancer in remission ([id number])    Other Father         ruptured gall bladder had removed    Diabetes Maternal Grandfather     Heart Attack Paternal Grandfather 80       Review of Systems:     Review of Systems   Constitutional: Positive for appetite change (decreased), fatigue and unexpected weight change. Negative for chills and fever. HENT: Negative. Negative for sore throat. Respiratory: Negative for cough, shortness of breath and wheezing. Cardiovascular: Negative for chest pain. Gastrointestinal: Positive for diarrhea (occasional). Negative for abdominal pain, blood in stool and nausea. Musculoskeletal: Positive for arthralgias (miltiple joint pain). Negative for myalgias. Skin: Positive for rash (nose and cheeks). Neurological: Negative for dizziness, light-headedness and headaches. Psychiatric/Behavioral: Positive for dysphoric mood.        Physical Exam:     Vitals:    11/11/21 1119   BP: (!) 140/94   Pulse: 99   SpO2: 97%   Weight: 151 lb 8 oz (68.7 kg)      Body mass index is 20.55 kg/m². Physical Exam  Constitutional:       Appearance: Normal appearance. He is well-developed and well-groomed. HENT:      Head: Normocephalic. Nose: Nose normal.      Mouth/Throat:      Mouth: Mucous membranes are moist.      Pharynx: Oropharynx is clear. Eyes:      Conjunctiva/sclera: Conjunctivae normal.      Pupils: Pupils are equal, round, and reactive to light. Neck:      Thyroid: No thyromegaly. Vascular: No carotid bruit. Cardiovascular:      Rate and Rhythm: Normal rate and regular rhythm. Heart sounds: Normal heart sounds. Pulmonary:      Effort: Pulmonary effort is normal.      Breath sounds: Normal breath sounds. No wheezing. Abdominal:      General: Bowel sounds are normal.      Palpations: Abdomen is soft. Tenderness: There is no abdominal tenderness. Musculoskeletal:         General: No swelling or tenderness. Cervical back: Neck supple. Right lower leg: No edema. Left lower leg: No edema. Lymphadenopathy:      Cervical: No cervical adenopathy. Skin:     Capillary Refill: Capillary refill takes less than 2 seconds. Findings: Rash present. Neurological:      Mental Status: He is alert and oriented to person, place, and time. Gait: Gait normal.   Psychiatric:         Mood and Affect: Mood is anxious and depressed. Behavior: Behavior is cooperative. Assessment/Plan:   1. Chronic pain of multiple joints  Comments:  Start Cymbalta for control of generalized joint pain and aches. Orders:  -     SAVAGE profile; Future  -     Rheumatoid Factor; Future  -     DULoxetine (CYMBALTA) 30 MG extended release capsule; Take 1 capsule by mouth daily, Disp-30 capsule, R-2Normal  -     CBC Auto Differential  -     Sedimentation Rate  -     Comprehensive Metabolic Panel  -     TSH WITH REFLEX TO FT4  -     Vitamin D 25 Hydroxy  -     T4, Free  2. Butterfly rash  -     SAVAGE profile;  Future  - Rheumatoid Factor; Future  -     CBC Auto Differential  -     Sedimentation Rate  -     Comprehensive Metabolic Panel  -     TSH WITH REFLEX TO FT4  -     Vitamin D 25 Hydroxy  -     T4, Free  3. Fatigue, unspecified type  -     SAVAGE profile; Future  -     Rheumatoid Factor; Future  -     CBC Auto Differential  -     Sedimentation Rate  -     Comprehensive Metabolic Panel  -     TSH WITH REFLEX TO FT4  -     Vitamin D 25 Hydroxy  -     T4, Free      All patient questions answered. Patient voiced understanding. Instructed to continue current medications. Patient agreed with treatment plan. Follow up as directed. Return if symptoms worsen or fail to improve. Please note that this chart was generated using voice recognition Dragon dictation software. Although every effort was made to ensure the accuracy of this automated transcription, some errors in transcription may have occurred.     Electronically signed by VINCE Ramon CNP on 11/21/2021

## 2021-11-16 ENCOUNTER — TELEPHONE (OUTPATIENT)
Dept: FAMILY MEDICINE CLINIC | Age: 31
End: 2021-11-16

## 2021-11-17 DIAGNOSIS — G89.29 CHRONIC PAIN OF MULTIPLE JOINTS: ICD-10-CM

## 2021-11-17 DIAGNOSIS — R77.9 ELEVATED SERUM PROTEIN LEVEL: ICD-10-CM

## 2021-11-17 DIAGNOSIS — R79.89 ABNORMAL TSH: Primary | ICD-10-CM

## 2021-11-17 DIAGNOSIS — E83.52 HYPERCALCEMIA: ICD-10-CM

## 2021-11-17 DIAGNOSIS — M25.50 CHRONIC PAIN OF MULTIPLE JOINTS: ICD-10-CM

## 2021-11-17 DIAGNOSIS — R53.83 FATIGUE, UNSPECIFIED TYPE: ICD-10-CM

## 2021-11-19 LAB
ALBUMIN/GLOBULIN RATIO: 1.59 G/DL
ALBUMIN: 5.1 G/DL (ref 3.5–5)
ALP BLD-CCNC: 40 UNITS/L (ref 38–126)
ALT SERPL-CCNC: 16 UNITS/L (ref 4–50)
ANION GAP SERPL CALCULATED.3IONS-SCNC: 9.3 MMOL/L
AST SERPL-CCNC: 25 UNITS/L (ref 17–59)
BASOPHILS %: 2.02 (ref 0–3)
BASOPHILS ABSOLUTE: 0.12 (ref 0–0.3)
BILIRUB SERPL-MCNC: 0.8 MG/DL (ref 0.2–1.3)
BUN BLDV-MCNC: 10 MG/DL (ref 9–20)
C-REACTIVE PROTEIN: < 0.5 MG/DL (ref 0–1)
CALCIUM SERPL-MCNC: 10.1 MG/DL (ref 8.4–10.2)
CHLORIDE BLD-SCNC: 101 MMOL/L (ref 98–120)
CO2: 27 MMOL/L (ref 22–31)
CREAT SERPL-MCNC: 0.9 MG/DL (ref 0.7–1.3)
EOSINOPHILS %: 1.89 (ref 0–10)
EOSINOPHILS ABSOLUTE: 0.11 (ref 0–1.1)
GFR CALCULATED: > 60
GLOBULIN: 3.2 G/DL
GLUCOSE: 82 MG/DL (ref 75–110)
HCT VFR BLD CALC: 42.7 % (ref 42–52)
HEMOGLOBIN: 15.9 (ref 13.8–17.8)
LYMPHOCYTE %: 22.45 (ref 20–51.1)
LYMPHOCYTES ABSOLUTE: 1.32 (ref 1–5.5)
MCH RBC QN AUTO: 32.4 PG (ref 28.5–32.5)
MCHC RBC AUTO-ENTMCNC: 37.2 G/DL (ref 32–37)
MCV RBC AUTO: 87.1 FL (ref 80–94)
MONOCYTES %: 8.9 (ref 1.7–9.3)
MONOCYTES ABSOLUTE: 0.52 (ref 0.1–1)
NEUTROPHILS %: 64.74 (ref 42.2–75.2)
NEUTROPHILS ABSOLUTE: 3.8 (ref 2–8.1)
PDW BLD-RTO: 10.6 % (ref 10–15.5)
PLATELET # BLD: 245.8 THOU/MM3 (ref 130–400)
POTASSIUM SERPL-SCNC: 4.5 MMOL/L (ref 3.6–5)
PTH INTACT: 17.66 PG/ML (ref 15–65)
RBC: 4.9 M/UL (ref 4.7–6.1)
SEDIMENTATION RATE, ERYTHROCYTE: 3 MM/HR (ref 0–20)
SODIUM BLD-SCNC: 138 MMOL/L (ref 135–145)
TOTAL PROTEIN, SERUM: 8.3 G/DL (ref 6.3–8.2)
TSH REFLEX FT4: 0.68 MIU/ML (ref 0.49–4.67)
WBC: 5.9 THOU/ML3 (ref 4.8–10.8)

## 2021-11-21 PROBLEM — R21 BUTTERFLY RASH: Status: ACTIVE | Noted: 2021-11-21

## 2021-11-21 PROBLEM — G89.29 CHRONIC PAIN OF MULTIPLE JOINTS: Status: ACTIVE | Noted: 2021-11-21

## 2021-11-21 PROBLEM — M25.50 CHRONIC PAIN OF MULTIPLE JOINTS: Status: ACTIVE | Noted: 2021-11-21

## 2021-11-21 ASSESSMENT — ENCOUNTER SYMPTOMS
SORE THROAT: 0
SHORTNESS OF BREATH: 0
WHEEZING: 0
DIARRHEA: 1
COUGH: 0
BLOOD IN STOOL: 0
NAUSEA: 0
ABDOMINAL PAIN: 0
SWOLLEN GLANDS: 0

## 2021-11-22 DIAGNOSIS — M25.50 CHRONIC PAIN OF MULTIPLE JOINTS: ICD-10-CM

## 2021-11-22 DIAGNOSIS — R77.9 ELEVATED SERUM PROTEIN LEVEL: ICD-10-CM

## 2021-11-22 DIAGNOSIS — G89.29 CHRONIC PAIN OF MULTIPLE JOINTS: ICD-10-CM

## 2021-11-22 DIAGNOSIS — R53.83 FATIGUE, UNSPECIFIED TYPE: Primary | ICD-10-CM

## 2021-11-22 DIAGNOSIS — R63.0 DECREASED APPETITE: ICD-10-CM

## 2021-11-22 LAB
ALBUMIN PERCENT: 67 % (ref 45–65)
ALBUMIN SERPL-MCNC: 5.2 G/DL (ref 3.2–5.2)
ALPHA 1 GLOBULIN PERCENT: 2 % (ref 3–6)
ALPHA 2 GLOBULIN PERCENT: 8 % (ref 6–13)
ALPHA 2 GLOBULIN: 0.6 G/DL (ref 0.5–0.9)
ALPHA-1-GLOBULIN: 0.2 G/DL (ref 0.1–0.4)
BETA GLOBULIN PERCENT: 8 % (ref 11–19)
BETA GLOBULIN: 0.6 G/DL (ref 0.5–1.1)
FREE KAPPA LIGHT CHAINS: 1.46 MG/DL (ref 0.37–1.94)
FREE KAPPA/LAMBDA RATIO: 1.02 (ref 0.26–1.65)
FREE LAMBDA LIGHT CHAINS: 1.43 MG/DL (ref 0.57–2.63)
GAMMA GLOBULIN %: 15 % (ref 9–20)
GAMMA GLOBULIN: 1.2 G/DL (ref 0.5–1.5)
IGA: 158 MG/DL (ref 70–400)
IGG: 1390 MG/DL (ref 700–1600)
IGM: 166 MG/DL (ref 40–230)
INTERPRETATION IMMUNOFIXATION: NORMAL
INTERPRETATION: ABNORMAL
PATHOLOGIST REVIEW: NORMAL
PATHOLOGY REVIEW: ABNORMAL
TOTAL PROTEIN SUM PERCENT: 100 % (ref 98–102)
TOTAL PROTEIN SUM: 7.8 G/DL (ref 6.3–8.2)
TOTAL PROTEIN: 7.8 G/DL (ref 6.4–8.3)

## 2021-11-22 RX ORDER — DULOXETIN HYDROCHLORIDE 60 MG/1
60 CAPSULE, DELAYED RELEASE ORAL DAILY
Qty: 30 CAPSULE | Refills: 2 | Status: SHIPPED | OUTPATIENT
Start: 2021-11-22 | End: 2022-03-10 | Stop reason: SDUPTHER

## 2021-11-23 DIAGNOSIS — E83.52 HYPERCALCEMIA: ICD-10-CM

## 2021-11-23 DIAGNOSIS — G89.29 CHRONIC PAIN OF MULTIPLE JOINTS: ICD-10-CM

## 2021-11-23 DIAGNOSIS — M25.50 CHRONIC PAIN OF MULTIPLE JOINTS: ICD-10-CM

## 2021-11-23 DIAGNOSIS — R53.83 FATIGUE, UNSPECIFIED TYPE: ICD-10-CM

## 2021-11-23 DIAGNOSIS — R77.9 ELEVATED SERUM PROTEIN LEVEL: ICD-10-CM

## 2021-11-23 DIAGNOSIS — R21 BUTTERFLY RASH: ICD-10-CM

## 2021-12-09 ENCOUNTER — APPOINTMENT (OUTPATIENT)
Dept: CT IMAGING | Age: 31
End: 2021-12-09
Payer: COMMERCIAL

## 2021-12-09 ENCOUNTER — HOSPITAL ENCOUNTER (EMERGENCY)
Age: 31
Discharge: HOME | End: 2021-12-09
Attending: EMERGENCY MEDICINE
Payer: COMMERCIAL

## 2021-12-09 VITALS
WEIGHT: 155 LBS | DIASTOLIC BLOOD PRESSURE: 66 MMHG | BODY MASS INDEX: 20.99 KG/M2 | RESPIRATION RATE: 12 BRPM | TEMPERATURE: 97.3 F | HEART RATE: 60 BPM | OXYGEN SATURATION: 99 % | HEIGHT: 72 IN | SYSTOLIC BLOOD PRESSURE: 120 MMHG

## 2021-12-09 DIAGNOSIS — R53.1 GENERAL WEAKNESS: Primary | ICD-10-CM

## 2021-12-09 LAB
ABSOLUTE EOS #: 0.1 K/UL (ref 0–0.44)
ABSOLUTE IMMATURE GRANULOCYTE: 0.04 K/UL (ref 0–0.3)
ABSOLUTE LYMPH #: 1.47 K/UL (ref 1.1–3.7)
ABSOLUTE MONO #: 0.79 K/UL (ref 0.1–1.2)
ALBUMIN SERPL-MCNC: 4.9 G/DL (ref 3.5–5.2)
ALBUMIN/GLOBULIN RATIO: 1.7 (ref 1–2.5)
ALP BLD-CCNC: 67 U/L (ref 40–129)
ALT SERPL-CCNC: 16 U/L (ref 5–41)
ANION GAP SERPL CALCULATED.3IONS-SCNC: 10 MMOL/L (ref 9–17)
AST SERPL-CCNC: 19 U/L
BASOPHILS # BLD: 1 % (ref 0–2)
BASOPHILS ABSOLUTE: 0.1 K/UL (ref 0–0.2)
BILIRUB SERPL-MCNC: 0.33 MG/DL (ref 0.3–1.2)
BILIRUBIN DIRECT: 0.12 MG/DL
BILIRUBIN, INDIRECT: 0.21 MG/DL (ref 0–1)
BUN BLDV-MCNC: 11 MG/DL (ref 6–20)
BUN/CREAT BLD: 12 (ref 9–20)
CALCIUM SERPL-MCNC: 9.8 MG/DL (ref 8.6–10.4)
CHLORIDE BLD-SCNC: 100 MMOL/L (ref 98–107)
CO2: 29 MMOL/L (ref 20–31)
CREAT SERPL-MCNC: 0.91 MG/DL (ref 0.7–1.2)
D-DIMER QUANTITATIVE: 0.28 MG/L FEU (ref 0–0.59)
DIFFERENTIAL TYPE: ABNORMAL
EOSINOPHILS RELATIVE PERCENT: 1 % (ref 1–4)
GFR AFRICAN AMERICAN: >60 ML/MIN
GFR NON-AFRICAN AMERICAN: >60 ML/MIN
GFR SERPL CREATININE-BSD FRML MDRD: NORMAL ML/MIN/{1.73_M2}
GFR SERPL CREATININE-BSD FRML MDRD: NORMAL ML/MIN/{1.73_M2}
GLOBULIN: 2.9 G/DL (ref 1.5–3.8)
GLUCOSE BLD-MCNC: 92 MG/DL (ref 70–99)
HCT VFR BLD CALC: 41.8 % (ref 40.7–50.3)
HEMOGLOBIN: 14.8 G/DL (ref 13–17)
IMMATURE GRANULOCYTES: 0 %
LYMPHOCYTES # BLD: 14 % (ref 24–43)
MCH RBC QN AUTO: 33 PG (ref 25.2–33.5)
MCHC RBC AUTO-ENTMCNC: 35.4 G/DL (ref 25.2–33.5)
MCV RBC AUTO: 93.3 FL (ref 82.6–102.9)
MONOCYTES # BLD: 8 % (ref 3–12)
NRBC AUTOMATED: 0 PER 100 WBC
PDW BLD-RTO: 12.5 % (ref 11.8–14.4)
PLATELET # BLD: 240 K/UL (ref 138–453)
PLATELET ESTIMATE: ABNORMAL
PMV BLD AUTO: 9.5 FL (ref 8.1–13.5)
POTASSIUM SERPL-SCNC: 4.3 MMOL/L (ref 3.7–5.3)
RBC # BLD: 4.48 M/UL (ref 4.21–5.77)
RBC # BLD: ABNORMAL 10*6/UL
SEG NEUTROPHILS: 76 % (ref 36–65)
SEGMENTED NEUTROPHILS ABSOLUTE COUNT: 7.83 K/UL (ref 1.5–8.1)
SODIUM BLD-SCNC: 139 MMOL/L (ref 135–144)
TOTAL PROTEIN: 7.8 G/DL (ref 6.4–8.3)
WBC # BLD: 10.3 K/UL (ref 3.5–11.3)
WBC # BLD: ABNORMAL 10*3/UL

## 2021-12-09 PROCEDURE — 80076 HEPATIC FUNCTION PANEL: CPT

## 2021-12-09 PROCEDURE — 85025 COMPLETE CBC W/AUTO DIFF WBC: CPT

## 2021-12-09 PROCEDURE — 99285 EMERGENCY DEPT VISIT HI MDM: CPT

## 2021-12-09 PROCEDURE — 6360000004 HC RX CONTRAST MEDICATION: Performed by: EMERGENCY MEDICINE

## 2021-12-09 PROCEDURE — 36415 COLL VENOUS BLD VENIPUNCTURE: CPT

## 2021-12-09 PROCEDURE — 71260 CT THORAX DX C+: CPT

## 2021-12-09 PROCEDURE — 85379 FIBRIN DEGRADATION QUANT: CPT

## 2021-12-09 PROCEDURE — 80048 BASIC METABOLIC PNL TOTAL CA: CPT

## 2021-12-09 PROCEDURE — 2580000003 HC RX 258: Performed by: EMERGENCY MEDICINE

## 2021-12-09 RX ORDER — SODIUM CHLORIDE 9 MG/ML
1000 INJECTION, SOLUTION INTRAVENOUS CONTINUOUS
Status: DISCONTINUED | OUTPATIENT
Start: 2021-12-09 | End: 2021-12-09 | Stop reason: HOSPADM

## 2021-12-09 RX ADMIN — SODIUM CHLORIDE 1000 ML: 9 INJECTION, SOLUTION INTRAVENOUS at 13:17

## 2021-12-09 RX ADMIN — IOPAMIDOL 100 ML: 755 INJECTION, SOLUTION INTRAVENOUS at 13:46

## 2021-12-09 ASSESSMENT — ENCOUNTER SYMPTOMS
SHORTNESS OF BREATH: 0
DIARRHEA: 0
ABDOMINAL PAIN: 0
NAUSEA: 0

## 2021-12-09 NOTE — ED PROVIDER NOTES
[facility] ED  EMERGENCY DEPARTMENT ENCOUNTER      Pt Name: Huma Hernandez  MRN: [id number]  [place] 1990  Date of evaluation: 12/9/2021  Provider: Peter Carbone MD    [address] [place] [address]     Chief Complaint   Patient presents with    Dizziness     began today    Fatigue     x 1 month         HISTORY OF PRESENT ILLNESS   (Location/Symptom, Timing/Onset, Context/Setting,Quality, Duration, Modifying Factors, Severity)  Note limiting factors. Huma Hernandez is a 32 y.o. male who presents to the emergency department with a month long history of fatigue and night sweats but no weight loss. Today he started experiencing dizziness and headache. He is scheduled to see an oncologist on 23 December. The history is provided by the patient and medical records. Nursing Notes werereviewed. REVIEW OF SYSTEMS    (2-9 systems for level 4, 10 or more for level 5)     Review of Systems   Constitutional: Positive for fatigue. Negative for unexpected weight change. Night sweats   Respiratory: Negative for shortness of breath. Cardiovascular: Negative for chest pain. Gastrointestinal: Negative for abdominal pain, diarrhea and nausea. Genitourinary: Negative for difficulty urinating. Neurological: Negative for syncope. All other systems reviewed and are negative. Except as noted above the remainder of the review of systems was reviewed and negative. PAST MEDICAL HISTORY     Past Medical History:   Diagnosis Date    Irritable bowel syndrome     GI doctor in [place] was treating IBS         SURGICALHISTORY     History reviewed. No pertinent surgical history.       CURRENT MEDICATIONS       Discharge Medication List as of 12/9/2021  2:40 PM      CONTINUE these medications which have NOT CHANGED    Details   DULoxetine (CYMBALTA) 60 MG extended release capsule Take 1 capsule by mouth daily, Disp-30 capsule, R-2Normal      naproxen (NAPROSYN) 500 MG tablet Take 1 tablet by mouth 2 times daily (with meals), Disp-30 tablet,R-1Normal      metroNIDAZOLE (METROCREAM) 0.75 % cream Apply topically 2 times daily. , Disp-60 g, R-0, Normal      cetirizine (ZYRTEC) 10 MG tablet Take 10 mg by mouth dailyHistorical Med      Budesonide (RHINOCORT ALLERGY NA) 1 spray by Nasal route 2 times dailyHistorical Med             ALLERGIES     Pecan nut (diagnostic)    FAMILY HISTORY       Family History   Problem Relation Age of Onset    Cancer Father         brain cancer in remission ([id number])    Other Father         ruptured gall bladder had removed    Diabetes Maternal Grandfather     Heart Attack Paternal Grandfather 80          SOCIAL HISTORY       Social History     Socioeconomic History    Marital status:      Spouse name: None    Number of children: None    Years of education: None    Highest education level: None   Occupational History    None   Tobacco Use    Smoking status: Never Smoker    Smokeless tobacco: Never Used   Vaping Use    Vaping Use: Former    Quit date: 1/1/2016   Substance and Sexual Activity    Alcohol use: Yes     Comment: occasional    Drug use: Yes     Types: Marijuana Juanetta [place])     Comment: socially    Sexual activity: Yes     Partners: Female     Comment: wife   Other Topics Concern    None   Social History Narrative    None     Social Determinants of Health     Financial Resource Strain:     Difficulty of Paying Living Expenses: Not on file   Food Insecurity:     Worried About Running Out of Food in the Last Year: Not on file    Luis of Food in the Last Year: Not on file   Transportation Needs:     Lack of Transportation (Medical): Not on file    Lack of Transportation (Non-Medical):  Not on file   Physical Activity:     Days of Exercise per Week: Not on file    Minutes of Exercise per Session: Not on file   Stress:     Feeling of Stress : Not on file   Social Connections:     Frequency of Communication with Friends and Family: Not on file    Frequency of Social Gatherings with Friends and Family: Not on file    Attends Mu-ism Services: Not on file    Active Member of Clubs or Organizations: Not on file    Attends Club or Organization Meetings: Not on file    Marital Status: Not on file   Intimate Partner Violence:     Fear of Current or Ex-Partner: Not on file    Emotionally Abused: Not on file    Physically Abused: Not on file    Sexually Abused: Not on file   Housing Stability:     Unable to Pay for Housing in the Last Year: Not on file    Number of [place] in the Last Year: Not on file    Unstable Housing in the Last Year: Not on file       SCREENINGS    [place]rena Coma Scale  Eye Opening: Spontaneous  Best Verbal Response: Oriented  Best Motor Response: Obeys commands  [place] Coma Scale Score: 15        PHYSICAL EXAM    (up to 7 for level 4, 8 or more for level 5)     ED Triage Vitals [12/09/21 1158]   BP Temp Temp Source Pulse Resp SpO2 Height Weight   (!) 156/92 97.3 °F (36.3 °C) Tympanic 89 12 98 % 6' (1.829 m) 155 lb (70.3 kg)       Physical Exam  Vitals reviewed. Constitutional:       General: He is not in acute distress. Appearance: He is not ill-appearing. HENT:      Head: Normocephalic. Right Ear: External ear normal.      Left Ear: External ear normal.      Nose: Nose normal.   Eyes:      General: No scleral icterus. Extraocular Movements: Extraocular movements intact. Cardiovascular:      Rate and Rhythm: Normal rate and regular rhythm. Pulmonary:      Effort: Pulmonary effort is normal.      Breath sounds: Normal breath sounds. Abdominal:      Palpations: Abdomen is soft. There is no mass. Tenderness: There is no abdominal tenderness. Musculoskeletal:         General: Normal range of motion. Cervical back: Neck supple. Skin:     General: Skin is warm and dry. Coloration: Skin is not pale. Neurological:      General: No focal deficit present.       Mental Status: He is alert and oriented to person, place, and time. Cranial Nerves: No cranial nerve deficit. Coordination: Coordination normal.         DIAGNOSTIC RESULTS     EKG: All EKG's are interpreted by the Emergency Department Physician who either signs orCo-signs this chart in the absence of a cardiologist.    RADIOLOGY:     Interpretation per the Radiologist below, ifavailable at the time of this note:    CT CHEST ABDOMEN PELVIS W CONTRAST   Final Result   No abnormality identified to explain provided symptoms. ED BEDSIDE ULTRASOUND:   Performed by ED Physician - none    LABS:  Labs Reviewed   CBC WITH AUTO DIFFERENTIAL - Abnormal; Notable for the following components:       Result Value    MCHC 35.4 (*)     Seg Neutrophils 76 (*)     Lymphocytes 14 (*)     All other components within normal limits   BASIC METABOLIC PANEL W/ REFLEX TO MG FOR LOW K   D-DIMER, QUANTITATIVE   HEPATIC FUNCTION PANEL       All other labs were within normal range ornot returned as of this dictation. EMERGENCY DEPARTMENT COURSE and DIFFERENTIAL DIAGNOSIS/MDM:   Vitals:    Vitals:    12/09/21 1203 12/09/21 1204 12/09/21 1205 12/09/21 1403   BP: (!) 141/89 (!) 140/80 132/88 120/66   Pulse:    60   Resp:    12   Temp:       TempSrc:       SpO2: 96% 98% 98% 99%   Weight:       Height: The white count is normal and the hemoglobin is also normal at 14.8. Blood glucose is normal at 92 and kidney function is normal.  CT scan of the chest abdomen pelvis is obtained and no masses are reported. No abnormal findings are reported. Patient is discharged in stable condition and advised to continue follow-up with his physicians as scheduled. MDM    CONSULTS:  None    PROCEDURES:  Unlessotherwise noted below, none     Procedures    FINAL IMPRESSION      1. General weakness          DISPOSITION/PLAN   DISPOSITION Decision To Discharge 12/09/2021 02:39:51 PM      PATIENT REFERRED TO:  No follow-up provider specified.     DISCHARGE MEDICATIONS:         Problem List:  Patient Active Problem List   Diagnosis Code    Fatigue R53.83    Butterfly rash R21    Chronic pain of multiple joints M25.50, G89.29           Summation      Patient Course: Discharged    ED Medicationsadministered this visit:    Medications   0.9 % sodium chloride infusion (0 mLs IntraVENous Stopped 12/9/21 1430)   iopamidol (ISOVUE-370) 76 % injection 100 mL (100 mLs IntraVENous Given 12/9/21 1346)       New Prescriptions from this visit:    Discharge Medication List as of 12/9/2021  2:40 PM          Follow-up:  No follow-up provider specified. Final Impression:   1.  General weakness               (Please note that portions of this note were completed with a voice recognitionprogram.  Efforts were made to edit the dictations but occasionally words are mis-transcribed.)    Peter Carbone MD (electronically signed)  Attending Emergency Physician            Peter Carbone MD  12/09/21 9890

## 2021-12-13 ENCOUNTER — TELEPHONE (OUTPATIENT)
Dept: FAMILY MEDICINE CLINIC | Age: 31
End: 2021-12-13

## 2021-12-23 ENCOUNTER — OFFICE VISIT (OUTPATIENT)
Dept: ONCOLOGY | Age: 31
End: 2021-12-23
Payer: COMMERCIAL

## 2021-12-23 VITALS
DIASTOLIC BLOOD PRESSURE: 84 MMHG | OXYGEN SATURATION: 97 % | HEART RATE: 93 BPM | BODY MASS INDEX: 20.89 KG/M2 | TEMPERATURE: 98.1 F | SYSTOLIC BLOOD PRESSURE: 118 MMHG | WEIGHT: 154 LBS

## 2021-12-23 DIAGNOSIS — R53.83 OTHER FATIGUE: ICD-10-CM

## 2021-12-23 DIAGNOSIS — R61 NIGHT SWEAT: Primary | ICD-10-CM

## 2021-12-23 PROCEDURE — G8484 FLU IMMUNIZE NO ADMIN: HCPCS | Performed by: INTERNAL MEDICINE

## 2021-12-23 PROCEDURE — 99244 OFF/OP CNSLTJ NEW/EST MOD 40: CPT | Performed by: INTERNAL MEDICINE

## 2021-12-23 PROCEDURE — G8420 CALC BMI NORM PARAMETERS: HCPCS | Performed by: INTERNAL MEDICINE

## 2021-12-23 PROCEDURE — G8427 DOCREV CUR MEDS BY ELIG CLIN: HCPCS | Performed by: INTERNAL MEDICINE

## 2021-12-27 ENCOUNTER — TELEPHONE (OUTPATIENT)
Dept: GENERAL RADIOLOGY | Age: 31
End: 2021-12-27

## 2021-12-27 DIAGNOSIS — R61 NIGHT SWEAT: Primary | ICD-10-CM

## 2021-12-27 DIAGNOSIS — R53.83 OTHER FATIGUE: ICD-10-CM

## 2021-12-28 ENCOUNTER — TELEPHONE (OUTPATIENT)
Dept: GENERAL RADIOLOGY | Age: 31
End: 2021-12-28

## 2021-12-29 ENCOUNTER — TELEPHONE (OUTPATIENT)
Dept: GENERAL RADIOLOGY | Age: 31
End: 2021-12-29

## 2022-01-06 ENCOUNTER — HOSPITAL ENCOUNTER (OUTPATIENT)
Dept: CT IMAGING | Age: 32
Discharge: HOME | End: 2022-01-08
Payer: COMMERCIAL

## 2022-01-06 VITALS
TEMPERATURE: 96.9 F | DIASTOLIC BLOOD PRESSURE: 68 MMHG | HEIGHT: 72 IN | WEIGHT: 150.2 LBS | SYSTOLIC BLOOD PRESSURE: 121 MMHG | HEART RATE: 59 BPM | BODY MASS INDEX: 20.34 KG/M2 | RESPIRATION RATE: 16 BRPM | OXYGEN SATURATION: 96 %

## 2022-01-06 DIAGNOSIS — R53.83 OTHER FATIGUE: ICD-10-CM

## 2022-01-06 DIAGNOSIS — R61 NIGHT SWEAT: ICD-10-CM

## 2022-01-06 LAB
ABSOLUTE EOS #: 0.08 K/UL (ref 0–0.44)
ABSOLUTE IMMATURE GRANULOCYTE: 0.03 K/UL (ref 0–0.3)
ABSOLUTE LYMPH #: 1.56 K/UL (ref 1.1–3.7)
ABSOLUTE MONO #: 0.65 K/UL (ref 0.1–1.2)
BASOPHILS # BLD: 1 % (ref 0–2)
BASOPHILS ABSOLUTE: 0.09 K/UL (ref 0–0.2)
DIFFERENTIAL TYPE: ABNORMAL
EOSINOPHILS RELATIVE PERCENT: 1 % (ref 1–4)
HCT VFR BLD CALC: 45.2 % (ref 40.7–50.3)
HEMOGLOBIN: 15.8 G/DL (ref 13–17)
IMMATURE GRANULOCYTES: 0 %
INR BLD: 1.1
LYMPHOCYTES # BLD: 20 % (ref 24–43)
MCH RBC QN AUTO: 32.6 PG (ref 25.2–33.5)
MCHC RBC AUTO-ENTMCNC: 35 G/DL (ref 25.2–33.5)
MCV RBC AUTO: 93.2 FL (ref 82.6–102.9)
MONOCYTES # BLD: 8 % (ref 3–12)
NRBC AUTOMATED: 0 PER 100 WBC
PARTIAL THROMBOPLASTIN TIME: 28 SEC (ref 23.9–33.8)
PDW BLD-RTO: 12.3 % (ref 11.8–14.4)
PLATELET # BLD: 243 K/UL (ref 138–453)
PLATELET ESTIMATE: ABNORMAL
PMV BLD AUTO: 9.8 FL (ref 8.1–13.5)
PROTHROMBIN TIME: 13.4 SEC (ref 11.5–14.2)
RBC # BLD: 4.85 M/UL (ref 4.21–5.77)
RBC # BLD: ABNORMAL 10*6/UL
SEG NEUTROPHILS: 70 % (ref 36–65)
SEGMENTED NEUTROPHILS ABSOLUTE COUNT: 5.44 K/UL (ref 1.5–8.1)
WBC # BLD: 7.9 K/UL (ref 3.5–11.3)
WBC # BLD: ABNORMAL 10*3/UL

## 2022-01-06 PROCEDURE — 85025 COMPLETE CBC W/AUTO DIFF WBC: CPT

## 2022-01-06 PROCEDURE — 2709999900 CT GUIDED NEEDLE PLACEMENT

## 2022-01-06 PROCEDURE — 85610 PROTHROMBIN TIME: CPT

## 2022-01-06 PROCEDURE — 88184 FLOWCYTOMETRY/ TC 1 MARKER: CPT

## 2022-01-06 PROCEDURE — 99152 MOD SED SAME PHYS/QHP 5/>YRS: CPT

## 2022-01-06 PROCEDURE — 88280 CHROMOSOME KARYOTYPE STUDY: CPT

## 2022-01-06 PROCEDURE — 38221 DX BONE MARROW BIOPSIES: CPT

## 2022-01-06 PROCEDURE — 88305 TISSUE EXAM BY PATHOLOGIST: CPT

## 2022-01-06 PROCEDURE — 88264 CHROMOSOME ANALYSIS 20-25: CPT

## 2022-01-06 PROCEDURE — 88311 DECALCIFY TISSUE: CPT

## 2022-01-06 PROCEDURE — 7100000010 HC PHASE II RECOVERY - FIRST 15 MIN

## 2022-01-06 PROCEDURE — 6360000002 HC RX W HCPCS: Performed by: RADIOLOGY

## 2022-01-06 PROCEDURE — 36415 COLL VENOUS BLD VENIPUNCTURE: CPT

## 2022-01-06 PROCEDURE — 7100000011 HC PHASE II RECOVERY - ADDTL 15 MIN

## 2022-01-06 PROCEDURE — 88313 SPECIAL STAINS GROUP 2: CPT

## 2022-01-06 PROCEDURE — 88185 FLOWCYTOMETRY/TC ADD-ON: CPT

## 2022-01-06 PROCEDURE — 85730 THROMBOPLASTIN TIME PARTIAL: CPT

## 2022-01-06 PROCEDURE — 88237 TISSUE CULTURE BONE MARROW: CPT

## 2022-01-06 RX ORDER — SODIUM CHLORIDE 9 MG/ML
INJECTION, SOLUTION INTRAVENOUS CONTINUOUS
Status: CANCELLED | OUTPATIENT
Start: 2022-01-06

## 2022-01-06 RX ORDER — FENTANYL CITRATE 50 UG/ML
50 INJECTION, SOLUTION INTRAMUSCULAR; INTRAVENOUS ONCE
Status: COMPLETED | OUTPATIENT
Start: 2022-01-06 | End: 2022-01-06

## 2022-01-06 RX ORDER — FENTANYL CITRATE 50 UG/ML
25 INJECTION, SOLUTION INTRAMUSCULAR; INTRAVENOUS ONCE
Status: COMPLETED | OUTPATIENT
Start: 2022-01-06 | End: 2022-01-06

## 2022-01-06 RX ORDER — SODIUM CHLORIDE 9 MG/ML
INJECTION, SOLUTION INTRAVENOUS CONTINUOUS
Status: DISCONTINUED | OUTPATIENT
Start: 2022-01-06 | End: 2022-01-09 | Stop reason: HOSPADM

## 2022-01-06 RX ORDER — ACETAMINOPHEN 325 MG/1
650 TABLET ORAL EVERY 4 HOURS PRN
Status: DISCONTINUED | OUTPATIENT
Start: 2022-01-06 | End: 2022-01-09 | Stop reason: HOSPADM

## 2022-01-06 RX ADMIN — FENTANYL CITRATE 50 MCG: 50 INJECTION, SOLUTION INTRAMUSCULAR; INTRAVENOUS at 13:19

## 2022-01-06 RX ADMIN — FENTANYL CITRATE 25 MCG: 50 INJECTION, SOLUTION INTRAMUSCULAR; INTRAVENOUS at 13:25

## 2022-01-06 ASSESSMENT — PAIN SCALES - GENERAL
PAINLEVEL_OUTOF10: 0

## 2022-01-06 ASSESSMENT — PAIN - FUNCTIONAL ASSESSMENT: PAIN_FUNCTIONAL_ASSESSMENT: 0-10

## 2022-01-06 NOTE — PRE SEDATION
Sedation Pre-Procedure Note    Patient Name: Mariya Romero   YOB: 1990  Room/Bed: Room/bed info not found  Medical Record Number: [id number]  Date: 1/6/2022   Time: 1:12 PM       Indication:  Night sweats    Consent: I have discussed with the patient and/or the patient representative the indication, alternatives, and the possible risks and/or complications of the planned procedure and the anesthesia methods. The patient and/or patient representative appear to understand and agree to proceed. Vital Signs:   Vitals:    01/06/22 1239   BP: 103/62   Pulse: 77   Resp: 16   Temp: 96.9 °F (36.1 °C)   SpO2: 99%       Past Medical History:   has a past medical history of Irritable bowel syndrome. Past Surgical History:   has no past surgical history on file. Medications:   Scheduled Meds:   Continuous Infusions:    sodium chloride       PRN Meds:   Home Meds:   Prior to Admission medications    Medication Sig Start Date End Date Taking? Authorizing Provider   DULoxetine (CYMBALTA) 60 MG extended release capsule Take 1 capsule by mouth daily 11/22/21  Yes VINCE Vale CNP   naproxen (NAPROSYN) 500 MG tablet Take 1 tablet by mouth 2 times daily (with meals) 10/2/19  Yes VINCE Vale CNP   metroNIDAZOLE (METROCREAM) 0.75 % cream Apply topically 2 times daily. 10/20/20   VINCE Menezes NP   cetirizine (ZYRTEC) 10 MG tablet Take 10 mg by mouth daily  Patient not taking: Reported on 12/23/2021    Historical Provider, MD   Budesonide (RHINOCORT ALLERGY NA) 1 spray by Nasal route 2 times daily  Patient not taking: Reported on 12/23/2021    Historical Provider, MD     Coumadin Use Last 7 Days:  no  Antiplatelet drug therapy use last 7 days: no  Other anticoagulant use last 7 days: no  Additional Medication Information:  None      Pre-Sedation Documentation and Exam:   I have reviewed the patient's history and review of systems.     Mallampati Airway Assessment:  Mallampati Class I - (soft palate, fauces, uvula & anterior/posterior tonsillar pillars are visible)    Prior History of Anesthesia Complications:   none    ASA Classification:  Class 1 - A normal healthy patient    Sedation/ Anesthesia Plan:   intravenous sedation    Medications Planned:   midazolam (Versed) intravenously and fentanyl intravenously    Patient is an appropriate candidate for plan of sedation: yes    Electronically signed by Jerome Miller MD on 1/6/2022 at 1:12 PM

## 2022-01-06 NOTE — POST SEDATION
Sedation Post Procedure Note    Patient Name: Benny Mckenna   YOB: 1990  Room/Bed: Room/bed info not found  Medical Record Number: [id number]  Date: 1/6/2022   Time: 1:27 PM         Physicians/Assistants: Shirley Adams MD, MD    Procedure Performed:  BM aspiration and biopsy    Post-Sedation Vital Signs:  Vitals:    01/06/22 1239   BP: 103/62   Pulse: 77   Resp: 16   Temp: 96.9 °F (36.1 °C)   SpO2: 99%      Vital signs were reviewed and were stable after the procedure (see flow sheet for vitals)            Post-Sedation Exam: Lungs: no crackles or wheezing           Complications: none    Electronically signed by Shirley Adams MD on 1/6/2022 at 1:27 PM

## 2022-01-06 NOTE — PROGRESS NOTES
Lilia Cole                                                                                                                  12/23/2021  MRN:   [id number]  YOB: 1990  PCP:                           VINCE Causey - CNP  Referring Physician: Karen Griffin  Treating Physician Name: Buck Arcos MD      Reason for consultation:  Chief Complaint   Patient presents with   Maribeth Draft New Patient     fatigue, decreased appetite, elevated serum protein level, night sweats, low grade fevers. Patient presents to the clinic to establish care and for further work-up and evaluation for symptoms of drenching night sweats fatigue not feeling well. Current problems:  Constitutional symptoms of fatigue drenching night sweats    Active and recent treatments:  Bone marrow biopsy    Summary of Case/History:    Lilia Cole a 32 y.o.male is a patient with complains of drenching night sweats subjective fever severe fatigue presents to the clinic to establish care and for further work-up and evaluation. According the patient he had been in his usual state of health about 2 months ago. States that he does not feel like his normal self. Fatigue is severe enough that he is not able to perform his duties at work. Weight loss but complains of loss of appetite. Planes of generalized aches and pain was started on Cymbalta which helped him little bit but no significant change. Patient also complains of having dizziness and headaches. He has had colonoscopy which was unremarkable . Patient has had extensive work-up done patient hemoglobin was 14.8 WBC count 10.3 with adequate ANC platelet count was 947. MCV was 93. LFTs were within range.   Work-up for paraproteinemia was negative CT chest abdomen pelvis done in the ER with contrast was negative for any acute abnormality inflammatory markers including ESR and CRP are negative work-up for rheumatological disease was unremarkable including SAVAGE profile and RF factor patient was noted to have suppressed TSH but free T4 was within normal range. Past Medical History:   Past Medical History:   Diagnosis Date    Irritable bowel syndrome     GI doctor in [place] was treating IBS       Past Surgical History:  No pertinent surgical history    Patient Family Social History:     Social History     Socioeconomic History    Marital status:      Spouse name: None    Number of children: None    Years of education: None    Highest education level: None   Occupational History    None   Tobacco Use    Smoking status: Never Smoker    Smokeless tobacco: Never Used   Vaping Use    Vaping Use: Former    Quit date: 1/1/2016   Substance and Sexual Activity    Alcohol use: Yes     Comment: occasional    Drug use: Yes     Types: Marijuana Kamran Sharma     Comment: socially    Sexual activity: Yes     Partners: Female     Comment: wife   Other Topics Concern    None   Social History Narrative    None     Social Determinants of Health     Financial Resource Strain:     Difficulty of Paying Living Expenses: Not on file   Food Insecurity:     Worried About Running Out of Food in the Last Year: Not on file    Luis of Food in the Last Year: Not on file   Transportation Needs:     Lack of Transportation (Medical): Not on file    Lack of Transportation (Non-Medical):  Not on file   Physical Activity:     Days of Exercise per Week: Not on file    Minutes of Exercise per Session: Not on file   Stress:     Feeling of Stress : Not on file   Social Connections:     Frequency of Communication with Friends and Family: Not on file    Frequency of Social Gatherings with Friends and Family: Not on file    Attends [employer]ism Services: Not on file    Active Member of Clubs or Organizations: Not on file    Attends Club or Organization Meetings: Not on file    Marital Status: Not on file   Intimate Partner Violence:     Fear of Current or Ex-Partner: Not on file    Emotionally Abused: Not on file    Physically Abused: Not on file    Sexually Abused: Not on file   Housing Stability:     Unable to Pay for Housing in the Last Year: Not on file    Number of Places Lived in the Last Year: Not on file    Unstable Housing in the Last Year: Not on file     Family History   Problem Relation Age of Onset    Cancer Father         brain cancer in remission ([id number])    Other Father         ruptured gall bladder had removed    Diabetes Maternal Grandfather     Heart Attack Paternal Grandfather 80           Current Medications:     Current Outpatient Medications   Medication Sig Dispense Refill    DULoxetine (CYMBALTA) 60 MG extended release capsule Take 1 capsule by mouth daily 30 capsule 2    metroNIDAZOLE (METROCREAM) 0.75 % cream Apply topically 2 times daily. 60 g 0    naproxen (NAPROSYN) 500 MG tablet Take 1 tablet by mouth 2 times daily (with meals) 30 tablet 1    cetirizine (ZYRTEC) 10 MG tablet Take 10 mg by mouth daily (Patient not taking: Reported on 12/23/2021)      Budesonide (RHINOCORT ALLERGY NA) 1 spray by Nasal route 2 times daily (Patient not taking: Reported on 12/23/2021)       No current facility-administered medications for this visit. Allergies:   Pecan nut (diagnostic)    Review of Systems:    Constitutional: Positive drenching night sweats. Positive subjective fevers. Positive severe fatigue.   Negative for weight loss  Eyes: No eye discharge, double vision, or eye pain   HEENT: negative for sore mouth, sore throat, hoarseness and voice change   Respiratory: negative for cough , sputum, dyspnea, wheezing, hemoptysis, chest pain   Cardiovascular: negative for chest pain, dyspnea, palpitations, orthopnea, PND   Gastrointestinal: negative for nausea, vomiting, diarrhea, constipation, abdominal pain, Dysphagia, hematemesis and hematochezia   Genitourinary: negative for frequency, dysuria, nocturia, urinary incontinence, and hematuria   Integument: negative for rash, skin lesions, bruises.    Hematologic/Lymphatic: negative for easy bruising, bleeding, lymphadenopathy, or petechiae   Endocrine: negative for heat or cold intolerance,weight changes, change in bowel habits and hair loss   Musculoskeletal: Positive for generalized muscle aches and pains  Neurological: negative for headaches, dizziness, seizures, weakness, numbness        Physical Exam:    Vitals: /84 (Site: Right Upper Arm, Position: Sitting, Cuff Size: Medium Adult)   Pulse 93   Temp 98.1 °F (36.7 °C)   Wt 154 lb (69.9 kg)   SpO2 97%   BMI 20.89 kg/m²   General appearance - well appearing, no in pain or distress  Mental status - AAO X3  Eyes - pupils equal and reactive, extraocular eye movements intact  Mouth - mucous membranes moist, pharynx normal without lesions  Neck - supple, no significant adenopathy  Lymphatics - no palpable lymphadenopathy, no hepatosplenomegaly  Chest - clear to auscultation, no wheezes, rales or rhonchi, symmetric air entry  Heart - normal rate, regular rhythm, normal S1, S2, no murmurs  Abdomen - soft, nontender, nondistended, no masses or organomegaly  Neurological - alert, oriented, normal speech, no focal findings or movement disorder noted  Extremities - peripheral pulses normal, no pedal edema, no clubbing or cyanosis  Skin - normal coloration and turgor, no rashes, no suspicious skin lesions noted       DATA:    Results for orders placed or performed during the hospital encounter of 12/09/21   CBC Auto Differential   Result Value Ref Range    WBC 10.3 3.5 - 11.3 k/uL    RBC 4.48 4.21 - 5.77 m/uL    Hemoglobin 14.8 13.0 - 17.0 g/dL    Hematocrit 41.8 40.7 - 50.3 %    MCV 93.3 82.6 - 102.9 fL    MCH 33.0 25.2 - 33.5 pg    MCHC 35.4 (H) 25.2 - 33.5 g/dL    RDW 12.5 11.8 - 14.4 %    Platelets 536 622 - 505 k/uL    MPV 9.5 8.1 - 13.5 fL    NRBC Automated 0.0 0.0 per 100 WBC    Differential Type NOT REPORTED     Seg Neutrophils 76 (H) 36 - 65 %    Lymphocytes 14 (L) 24 - 43 % Monocytes 8 3 - 12 %    Eosinophils % 1 1 - 4 %    Basophils 1 0 - 2 %    Immature Granulocytes 0 0 %    Segs Absolute 7.83 1.50 - 8.10 k/uL    Absolute Lymph # 1.47 1.10 - 3.70 k/uL    Absolute Mono # 0.79 0.10 - 1.20 k/uL    Absolute Eos # 0.10 0.00 - 0.44 k/uL    Basophils Absolute 0.10 0.00 - 0.20 k/uL    Absolute Immature Granulocyte 0.04 0.00 - 0.30 k/uL    WBC Morphology NOT REPORTED     RBC Morphology NOT REPORTED     Platelet Estimate NOT REPORTED    Basic Metabolic Panel w/ Reflex to MG   Result Value Ref Range    Glucose 92 70 - 99 mg/dL    BUN 11 6 - 20 mg/dL    CREATININE 0.91 0.70 - 1.20 mg/dL    Bun/Cre Ratio 12 9 - 20    Calcium 9.8 8.6 - 10.4 mg/dL    Sodium 139 135 - 144 mmol/L    Potassium 4.3 3.7 - 5.3 mmol/L    Chloride 100 98 - 107 mmol/L    CO2 29 20 - 31 mmol/L    Anion Gap 10 9 - 17 mmol/L    GFR Non-African American >60 >60 mL/min    GFR African American >60 >60 mL/min    GFR Comment          GFR Staging NOT REPORTED    D-Dimer, Quantitative   Result Value Ref Range    D-Dimer, Quant 0.28 0.00 - 0.59 mg/L FEU   Hepatic Function Panel   Result Value Ref Range    Albumin 4.9 3.5 - 5.2 g/dL    Alkaline Phosphatase 67 40 - 129 U/L    ALT 16 5 - 41 U/L    AST 19 <40 U/L    Total Bilirubin 0.33 0.3 - 1.2 mg/dL    Bilirubin, Direct 0.12 <0.31 mg/dL    Bilirubin, Indirect 0.21 0.00 - 1.00 mg/dL    Total Protein 7.8 6.4 - 8.3 g/dL    Globulin 2.9 1.5 - 3.8 g/dL    Albumin/Globulin Ratio 1.7 1.0 - 2.5     CT CHEST ABDOMEN PELVIS W CONTRAST    Result Date: 12/9/2021  EXAMINATION: CT OF THE CHEST, ABDOMEN, AND PELVIS WITH CONTRAST 12/9/2021 1:38 pm TECHNIQUE: CT of the chest, abdomen and pelvis was performed with the administration of intravenous contrast. Multiplanar reformatted images are provided for review. Dose modulation, iterative reconstruction, and/or weight based adjustment of the mA/kV was utilized to reduce the radiation dose to as low as reasonably achievable.  COMPARISON: None HISTORY: ORDERING SYSTEM PROVIDED HISTORY: fatigue night sweats TECHNOLOGIST PROVIDED HISTORY: fatigue night sweats Decision Support Exception - unselect if not a suspected or confirmed emergency medical condition->Emergency Medical Condition (MA) Reason for Exam:  Fatigue, night sweats, dizziness FINDINGS: Chest: Mediastinum: The heart and great vessels are normal in size. No pericardial effusion. No enlarged or suspicious-appearing lymph nodes are identified. Lungs/pleura: No acute airspace disease or effusion. Soft Tissues/Bones: No abnormality identified. Abdomen/Pelvis: Lower Chest: The visualized lung bases are clear. Organs: The liver, gallbladder, pancreas, spleen, adrenals and kidneys reveal no acute findings. GI/Bowel: There is no bowel dilatation or wall thickening identified. Pelvis: No acute findings. Peritoneum/Retroperitoneum: No free air or free fluid. The aorta is normal in caliber. The visceral branches are patent. No lymphadenopathy. Bones/Soft Tissues: No abnormality identified. No abnormality identified to explain provided symptoms. Impression:  Constitutional symptoms with subjective fevers severe fatigue and drenching night sweats  Arthralgias  Low TSH with normal free T4    Plan:  I had a detailed discussion with the patient and personally went over results of lab work-up imaging studies and other relevant clinical data. Reviewed previous medical record. Reviewed records from ER visit  Patient has continues have constitutional symptoms however extensive work-up done has been negative patient symptoms are severe enough that it is affecting his quality of life he is not able to perform his duties at work. CT scans do not show any evidence of malignancy.   Discussed possibility of occult hematological malignancy potentially causing the symptoms however probability is low but given patient's young age and severity of symptoms which is affecting his quality of life adversely I believe patient would benefit from a bone marrow biopsy to rule out any occult malignancy such as lymphoma is causing above symptoms  Discussed the procedure with him patient is willing to proceed with the procedure. I will refer patient to interventional etiology for a bone marrow biopsy and see patient back with the results. Patient multiple questions answered the best my ability. Genia Wiseman MD    I spent more than 60 minutes examining, evaluating, reviewing data, counseling the patient and coordinating care. Greater than 50% of time was spent face-to-face with the patient this note is created with the assistance of a speech recognition program.  While intending to generate a document that actually reflects the content of the visit, the document can still have some errors including those of syntax and sound a like substitutions which may escape proof reading. It such instances, actual meaning can be extrapolated by contextual diversion.

## 2022-01-11 LAB
BONE MARROW REPORT: NORMAL
FLOW CYTOMETRY, BM: NORMAL

## 2022-01-12 LAB — CHROMOSOME STUDY: NORMAL

## 2022-01-28 ENCOUNTER — OFFICE VISIT (OUTPATIENT)
Dept: ONCOLOGY | Age: 32
End: 2022-01-28
Payer: COMMERCIAL

## 2022-01-28 ENCOUNTER — HOSPITAL ENCOUNTER (OUTPATIENT)
Dept: LAB | Age: 32
Discharge: HOME | End: 2022-01-28
Payer: COMMERCIAL

## 2022-01-28 VITALS
HEART RATE: 79 BPM | WEIGHT: 149.6 LBS | BODY MASS INDEX: 20.26 KG/M2 | DIASTOLIC BLOOD PRESSURE: 60 MMHG | RESPIRATION RATE: 16 BRPM | SYSTOLIC BLOOD PRESSURE: 110 MMHG | HEIGHT: 72 IN | OXYGEN SATURATION: 98 % | TEMPERATURE: 98.1 F

## 2022-01-28 DIAGNOSIS — R61 NIGHT SWEAT: ICD-10-CM

## 2022-01-28 DIAGNOSIS — E61.1 IRON DEFICIENCY: ICD-10-CM

## 2022-01-28 DIAGNOSIS — G89.29 CHRONIC PAIN OF MULTIPLE JOINTS: ICD-10-CM

## 2022-01-28 DIAGNOSIS — M25.50 CHRONIC PAIN OF MULTIPLE JOINTS: ICD-10-CM

## 2022-01-28 DIAGNOSIS — R53.83 OTHER FATIGUE: ICD-10-CM

## 2022-01-28 DIAGNOSIS — R53.83 OTHER FATIGUE: Primary | ICD-10-CM

## 2022-01-28 LAB — HAPTOGLOBIN: 232 MG/DL (ref 30–200)

## 2022-01-28 PROCEDURE — 83550 IRON BINDING TEST: CPT

## 2022-01-28 PROCEDURE — 36415 COLL VENOUS BLD VENIPUNCTURE: CPT

## 2022-01-28 PROCEDURE — 83010 ASSAY OF HAPTOGLOBIN QUANT: CPT

## 2022-01-28 PROCEDURE — G8420 CALC BMI NORM PARAMETERS: HCPCS | Performed by: INTERNAL MEDICINE

## 2022-01-28 PROCEDURE — 1036F TOBACCO NON-USER: CPT | Performed by: INTERNAL MEDICINE

## 2022-01-28 PROCEDURE — G8484 FLU IMMUNIZE NO ADMIN: HCPCS | Performed by: INTERNAL MEDICINE

## 2022-01-28 PROCEDURE — 83540 ASSAY OF IRON: CPT

## 2022-01-28 PROCEDURE — G8427 DOCREV CUR MEDS BY ELIG CLIN: HCPCS | Performed by: INTERNAL MEDICINE

## 2022-01-28 PROCEDURE — 99214 OFFICE O/P EST MOD 30 MIN: CPT | Performed by: INTERNAL MEDICINE

## 2022-01-28 NOTE — PROGRESS NOTES
Jimmie Birmingham                                                                                                                  1/28/2022  MRN:   [id number]  YOB: 1990  PCP:                           VINCE Virk CNP  Referring Physician: No ref. 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 Patient underwent bone marrow biopsy of any complications. Bone marrow biopsy does not show any evidence of dysplasia or malignancy but shows absent iron stores. Patient recently taking iron 1 tablet a day for the last 1 month. He still has lost weight complains of worsening of night sweats complains of being tired. States appetite is good but he is still losing weight. Summary of Case/History:    Jimime Birmingham a 32 y.o.male is a patient with complains of drenching night sweats subjective fever severe fatigue presents to the clinic to establish care and for further work-up and evaluation. According the patient he had been in his usual state of health about 2 months ago. States that he does not feel like his normal self. Fatigue is severe enough that he is not able to perform his duties at work. Weight loss but complains of loss of appetite. Planes of generalized aches and pain was started on Cymbalta which helped him little bit but no significant change. Patient also complains of having dizziness and headaches. He has had colonoscopy which was unremarkable .   Patient has had extensive work-up done patient hemoglobin was 14.8 WBC count 10.3 with adequate ANC platelet count was 759. MCV was 93. LFTs were within range. Work-up for paraproteinemia was negative CT chest abdomen pelvis done in the ER with contrast was negative for any acute abnormality inflammatory markers including ESR and CRP are negative work-up for rheumatological disease was unremarkable including SAVAGE profile and RF factor patient was noted to have suppressed TSH but free T4 was within normal range. Past Medical History:   Past Medical History:   Diagnosis Date    Irritable bowel syndrome     GI doctor in [place] was treating IBS       Past Surgical History:  No pertinent surgical history    Patient Family Social History:     Social History     Socioeconomic History    Marital status:      Spouse name: None    Number of children: None    Years of education: None    Highest education level: None   Occupational History    None   Tobacco Use    Smoking status: Never Smoker    Smokeless tobacco: Never Used   Vaping Use    Vaping Use: Former    Quit date: 1/1/2016   Substance and Sexual Activity    Alcohol use: Yes     Comment: occasional    Drug use: Yes     Types: Marijuana Olene Rishi)     Comment: socially    Sexual activity: Yes     Partners: Female     Comment: wife   Other Topics Concern    None   Social History Narrative    None     Social Determinants of Health     Financial Resource Strain:     Difficulty of Paying Living Expenses: Not on file   Food Insecurity:     Worried About Running Out of Food in the Last Year: Not on file    Luis of Food in the Last Year: Not on file   Transportation Needs:     Lack of Transportation (Medical): Not on file    Lack of Transportation (Non-Medical):  Not on file   Physical Activity:     Days of Exercise per Week: Not on file    Minutes of Exercise per Session: Not on file   Stress:     Feeling of Stress : Not on file   Social Connections:     Frequency of Communication with Friends and Family: Not on file    Frequency of Social Gatherings with Friends and Family: Not on file    Attends Sabianism Services: Not on file    Active Member of Clubs or Organizations: Not on file    Attends Club or Organization Meetings: Not on file    Marital Status: Not on file   Intimate Partner Violence:     Fear of Current or Ex-Partner: Not on file    Emotionally Abused: Not on file    Physically Abused: Not on file    Sexually Abused: Not on file   Housing Stability:     Unable to Pay for Housing in the Last Year: Not on file    Number of [place] in the Last Year: Not on file    Unstable Housing in the Last Year: Not on file     Family History   Problem Relation Age of Onset    Cancer Father         brain cancer in remission ([id number])    Other Father         ruptured gall bladder had removed    Diabetes Maternal Grandfather     Heart Attack Paternal Grandfather 80           Current Medications:     Current Outpatient Medications   Medication Sig Dispense Refill    DULoxetine (CYMBALTA) 60 MG extended release capsule Take 1 capsule by mouth daily 30 capsule 2    naproxen (NAPROSYN) 500 MG tablet Take 1 tablet by mouth 2 times daily (with meals) 30 tablet 1    metroNIDAZOLE (METROCREAM) 0.75 % cream Apply topically 2 times daily. (Patient not taking: Reported on 1/28/2022) 60 g 0    cetirizine (ZYRTEC) 10 MG tablet Take 10 mg by mouth daily (Patient not taking: Reported on 12/23/2021)      Budesonide (RHINOCORT ALLERGY NA) 1 spray by Nasal route 2 times daily (Patient not taking: Reported on 12/23/2021)       No current facility-administered medications for this visit. Allergies:   Pecan nut (diagnostic) and Flagyl [metronidazole]    Review of Systems:    Constitutional: Positive drenching night sweats. Positive subjective fevers. Positive severe fatigue.   Negative for weight loss  Eyes: No eye discharge, double vision, or eye pain   HEENT: negative for sore mouth, sore throat, hoarseness and voice change   Respiratory: negative for cough , sputum, dyspnea, wheezing, hemoptysis, chest pain   Cardiovascular: negative for chest pain, dyspnea, palpitations, orthopnea, PND   Gastrointestinal: negative for nausea, vomiting, diarrhea, constipation, abdominal pain, Dysphagia, hematemesis and hematochezia   Genitourinary: negative for frequency, dysuria, nocturia, urinary incontinence, and hematuria   Integument: negative for rash, skin lesions, bruises.    Hematologic/Lymphatic: negative for easy bruising, bleeding, lymphadenopathy, or petechiae   Endocrine: negative for heat or cold intolerance,weight changes, change in bowel habits and hair loss   Musculoskeletal: Positive for generalized muscle aches and pains  Neurological: negative for headaches, dizziness, seizures, weakness, numbness        Physical Exam:    Vitals: /60 (Site: Right Upper Arm, Position: Sitting, Cuff Size: Medium Adult)   Pulse 79   Temp 98.1 °F (36.7 °C) (Temporal)   Resp 16   Ht 6' (1.829 m)   Wt 149 lb 9.6 oz (67.9 kg)   SpO2 98%   BMI 20.29 kg/m²   General appearance - well appearing, no in pain or distress  Mental status - AAO X3  Eyes - pupils equal and reactive, extraocular eye movements intact  Mouth - mucous membranes moist, pharynx normal without lesions  Neck - supple, no significant adenopathy  Lymphatics - no palpable lymphadenopathy, no hepatosplenomegaly  Chest - clear to auscultation, no wheezes, rales or rhonchi, symmetric air entry  Heart - normal rate, regular rhythm, normal S1, S2, no murmurs  Abdomen - soft, nontender, nondistended, no masses or organomegaly  Neurological - alert, oriented, normal speech, no focal findings or movement disorder noted  Extremities - peripheral pulses normal, no pedal edema, no clubbing or cyanosis  Skin - normal coloration and turgor, no rashes, no suspicious skin lesions noted       DATA:    Results for orders placed or performed during the hospital encounter of 01/06/22 APTT   Result Value Ref Range    PTT 28.0 23.9 - 33.8 sec   Protime-INR   Result Value Ref Range    Protime 13.4 11.5 - 14.2 sec    INR 1.1    CBC Auto Differential   Result Value Ref Range    WBC 7.9 3.5 - 11.3 k/uL    RBC 4.85 4.21 - 5.77 m/uL    Hemoglobin 15.8 13.0 - 17.0 g/dL    Hematocrit 45.2 40.7 - 50.3 %    MCV 93.2 82.6 - 102.9 fL    MCH 32.6 25.2 - 33.5 pg    MCHC 35.0 (H) 25.2 - 33.5 g/dL    RDW 12.3 11.8 - 14.4 %    Platelets 726 918 - 576 k/uL    MPV 9.8 8.1 - 13.5 fL    NRBC Automated 0.0 0.0 per 100 WBC    Differential Type NOT REPORTED     Seg Neutrophils 70 (H) 36 - 65 %    Lymphocytes 20 (L) 24 - 43 %    Monocytes 8 3 - 12 %    Eosinophils % 1 1 - 4 %    Basophils 1 0 - 2 %    Immature Granulocytes 0 0 %    Segs Absolute 5.44 1.50 - 8.10 k/uL    Absolute Lymph # 1.56 1.10 - 3.70 k/uL    Absolute Mono # 0.65 0.10 - 1.20 k/uL    Absolute Eos # 0.08 0.00 - 0.44 k/uL    Basophils Absolute 0.09 0.00 - 0.20 k/uL    Absolute Immature Granulocyte 0.03 0.00 - 0.30 k/uL    WBC Morphology NOT REPORTED     RBC Morphology NOT REPORTED     Platelet Estimate NOT REPORTED    Flow cytometry leukemia/lymphoma bone marrow   Result Value Ref Range    Flow Cytometry, BM [id number]    Chromosome Study   Result Value Ref Range    Chromosome Study       Specimen(s) Received:  Bone Marrow  Clinical Information:  Fatigue, Night Sweats, Elevated Serum Protein Electrophoresis    RESULTS:  Modal No. of Chromosomes:     46               No. of Cells Counted:      20       Staining Method:               GTG               No. of Cells  Analyzed:     20       No. and/or Type of Cultures:     24MF;48CM          Hypomodal Cells:           2       No. of Karyograms:               5               Hypermodal Cells:           0         Karyotype:                 46,XY[20]    Cytogenetic Diagnosis:       Normal Male Chromosome Complement    Interpretation:       Cytogenetic examination of twenty metaphase cells revealed a normal  karyotype with no consistent numerical or structural chromosome  aberrations observed. Please note that this analysis does not eliminate the possibility of  single gene defects, chromosomal mosaicism involving abnormal cell  lines of low frequency, small structural chromosome abnormalities, or  failure to sample any mal ignant clone(s) that may be present. Professional component performed by Carli Mayen, Ph.D., [facility] [address] [place] [address] (CLIA #: [id number]). **Electronically Signed Out**  Carli Mayen, Ph.D., .A.C.M.. [facility]  CLINICAL CYTOGENETICS LABORATORY  [address] [place] [address], [address] [place], [address]   Tel [phone]  [address] [place] [address] for DNA Diagnostics     Bone Marrow   Result Value Ref Range    Bone Marrow Report       [id number]  [facility]  [facility]  ANATOMIC PATHOLOGY  [address] [place] [address], [address]. [place], [address]  [phone]  Fax: [phone]  BONE MARROW REPORT    Patient Name: Fran Shaver  MR#: [id number]  Specimen #[id number]    Procedures/Addenda  FLOW CYTOMETRY REPORT     Date Ordered:     1/7/2022     Status:  Signed Out       Date Complete:     1/7/2022     By:  Michi Diaz M.D. Date Reported:     1/11/2022       INTERPRETATION    FLOW CYTOMETRIC IMMUNOPHENOTYPING ANALYSIS OF BONE MARROW ASPIRATE  LYMPHOCYTE POPULATION IS NEGATIVE FOR B-CELL MONOCLONALITY AND T-CELL  ABERRANCY (REACTIVE LYMPHOCYTES). RESULTS-COMMENTS    FLOW CYTOMETRIC ANALYSIS    Sample type:  Bone Marrow Aspirate     Flow cytometric immunophenotyping analysis is performed on bone marrow  aspirate following rbc lysis procedure. The cells are labeled by  direct ten color immunostaining procedure and analyzed on a [employer]  flow cytometer.     Bone marrow cytocentrifuge dif ferential cell count       12% Lymph       5% Monos       73% Myeloid cells    Viability:  95%    Marker Panels:            B-cell Tube:  CD5, CD10, CD19, CD20, CD22, CD23, CD45,  , Kappa, Lambda            T-cell Tube:  CD2, CD3, CD4, CD5, CD7, CD8, CD16, CD45,  CD56, CD57                   Myeloid Tubes:  CD13, CD33, CD34, CD38, CD45, CD71, , HLADR                                  This leuk/lymph immunophenotyping test was developed and its  performance characteristics determined by [address] [place] [address] Laboratory. It has not been cleared or approved by the U.S. Food and Drug Administration. The FDA has determined that such  clearance or approval is not necessary. This test is used for  clinical purposes. It should not be regarded as investigational or  for research. This laboratory is certified under the [address] (CLIA) as qualified to  perform high complexity clinical laboratory testing. Richard D. Arnaldo Fothergill, M.D. Final Diagnosis    Peripheral blood:    -  Within normal parameters, no morphologic abnormality. Bone marrow aspirate, clot section, and biopsy:    -  Maturing trilineage hematopoiesis, normal cellularity.    -  Absent storage iron. -  Negative for atypia and malignancy. CONSTANCE Jalloh  **Electronically Signed Out**         rdd/1/10/2022  Clinical Information   BONE MARROW ASPIRATION AND BIOPSY     Source:  1: PERIPHERAL BLOOD  2: BONE MARROW ASPIRATE, CLOT, AND BIOPSY    Gross Description  1. \"MARTINA CARRION, PERIPHERAL BLOOD\" One Finch [facility] stained  peripheral blood smear. 2.  Ely Ashlee ASP\" Two Finch [facility] stained aspirate smears. 2aFlora Wild UNDESIGNATED\" Red-brown clot, 2.1 x 1.2 x 0.3 cm  in aggregate. Representative sections 1cs. 2b.  \"MARTINA CARRION, UNDESIGNATED\" Gray-tan core, 1.5 cm in length x  0.2 cm in diameter. There are also fragments of red-brown clot, 1.5 x  0.5 x 0.2 cm.   Submitted in toto after short decalci fication, 1cs.  yr  tm        Microscopic Description    PERIPHERAL BLOOD STUDY    CBC: Please see the electronic health record for CBC parameters  ([id number], 01/06/2022, 12:32). PLATELETS: Platelets show normal morphology. LEUKOCYTES: White blood cells show normal morphology. There are no  blasts. ERYTHROCYTES: Red blood cells show normal morphology. Note: The electronic health record is reviewed. BONE MARROW FINDINGS    BONE MARROW ASPIRATE SMEAR  Normal %  (0-2)               1%     Blasts  (1-5)               3%     Promyelo  (32-72)               47%     Myelos/Metas/Bands/Segs  (13-37)               19%     Erythroid precursors  (7-23)               23%     Lymphocytes  (0-2)               1%     Plasma cells  (1-6)               1%     Eosinophils  (0-1)               0%     Basophils  (0-4)               5%     Monocytes    Myeloid/erythroid     (1.5-4):      2.5:1     Cells counted:300    The bone marrow aspirate smears have adequa te cellularity and spicules  for evaluation. Megakaryocytes are present in adequate number with  normal morphology. Myelopoiesis and erythropoiesis are orderly and  complete without atypia. Lymphocytes are small and mature. Plasma  cells and blasts are not increased. BONE MARROW BIOPSY AND ASPIRATE CLOT SECTION    Bone marrow aspirate clot sections and bone biopsy core sections show  adequate material and cellularity for evaluation. Bony spicules and  blood vessels are unremarkable. Cellularity is variable and 40-50 %. Megakaryocytes are present in adequate number and have normal  morphology. Myelopoiesis and erythropoiesis are orderly and complete  without atypia. There are no abnormal lymphoid infiltrates or  aggregates. The iron stain with appropriate control shows absent  storage iron. There are no ringed sideroblasts. Notes: Cytogenetic analysis is pending for additional  characterizations, report to follow.        CT CHEST ABDOMEN PELVIS W CONTRAST    Result Date: 12/9/2021  EXAMINATION: CT OF THE CHEST, ABDOMEN, AND PELVIS WITH CONTRAST 12/9/2021 1:38 pm TECHNIQUE: CT of the chest, abdomen and pelvis was performed with the administration of intravenous contrast. Multiplanar reformatted images are provided for review. Dose modulation, iterative reconstruction, and/or weight based adjustment of the mA/kV was utilized to reduce the radiation dose to as low as reasonably achievable. COMPARISON: None HISTORY: ORDERING SYSTEM PROVIDED HISTORY: fatigue night sweats TECHNOLOGIST PROVIDED HISTORY: fatigue night sweats Decision Support Exception - unselect if not a suspected or confirmed emergency medical condition->Emergency Medical Condition (MA) Reason for Exam:  Fatigue, night sweats, dizziness FINDINGS: Chest: Mediastinum: The heart and great vessels are normal in size. No pericardial effusion. No enlarged or suspicious-appearing lymph nodes are identified. Lungs/pleura: No acute airspace disease or effusion. Soft Tissues/Bones: No abnormality identified. Abdomen/Pelvis: Lower Chest: The visualized lung bases are clear. Organs: The liver, gallbladder, pancreas, spleen, adrenals and kidneys reveal no acute findings. GI/Bowel: There is no bowel dilatation or wall thickening identified. Pelvis: No acute findings. Peritoneum/Retroperitoneum: No free air or free fluid. The aorta is normal in caliber. The visceral branches are patent. No lymphadenopathy. Bones/Soft Tissues: No abnormality identified. No abnormality identified to explain provided symptoms. Impression:  Constitutional symptoms with subjective fevers severe fatigue and drenching night sweats  Absent iron stores on bone marrow biopsy  Arthralgias  Low TSH with normal free T4    Plan:  I had a detailed discussion with the patient and personally went over results of lab work-up imaging studies and other relevant clinical data.   Reviewed results of bone marrow biopsy. No evidence of dysplasia or malignancy  Bone marrow shows absent iron stores  I advised the patient to increase dose of oral iron to twice daily along with vitamin C or orange juice  I will check iron studies today if iron is very low will discuss IV iron given symptoms of fatigue but I am not sure if iron deficiency is contributing to the night sweats which are worsening  We will schedule follow-up depending on the iron studies  In the meanwhile I advised patient to continue follow-up with the primary care physician for further work-up of patient's constitutional symptoms. From medical oncology standpoint there is no evidence of malignancy on the CT scans and bone marrow biopsy. Discussed the procedure with him patient is willing to proceed with the procedure. I will refer patient to interventional etiology for a bone marrow biopsy and see patient back with the results. Patient multiple questions answered the best my ability. Addendum:    Blood iron test suggest adequate iron stores. No plans for IV iron infusion at this point. We will see patient back in office on as-needed basis. Rima Dubois MD    This note is created with the assistance of a speech recognition program.  While intending to generate a document that actually reflects the content of the visit, the document can still have some errors including those of syntax and sound a like substitutions which may escape proof reading. It such instances, actual meaning can be extrapolated by contextual diversion.

## 2022-01-29 LAB
IRON SATURATION: 52 % (ref 20–55)
IRON: 153 UG/DL (ref 59–158)
TOTAL IRON BINDING CAPACITY: 293 UG/DL (ref 250–450)
UNSATURATED IRON BINDING CAPACITY: 140 UG/DL (ref 112–347)

## 2022-02-04 ENCOUNTER — TELEPHONE (OUTPATIENT)
Dept: ONCOLOGY | Age: 32
End: 2022-02-04

## 2022-02-04 NOTE — TELEPHONE ENCOUNTER
Patient had labs done on 1/28. Patient calling regarding results and when he should be scheduled for follow up. Please advise, thanks.

## 2022-02-05 NOTE — TELEPHONE ENCOUNTER
Blood testing on iron came back okay. Continue taking oral iron as previously was. Return visit as needed.

## 2022-03-09 DIAGNOSIS — M25.50 CHRONIC PAIN OF MULTIPLE JOINTS: ICD-10-CM

## 2022-03-09 DIAGNOSIS — G89.29 CHRONIC PAIN OF MULTIPLE JOINTS: ICD-10-CM

## 2022-03-10 DIAGNOSIS — G89.29 CHRONIC PAIN OF MULTIPLE JOINTS: ICD-10-CM

## 2022-03-10 DIAGNOSIS — M25.50 CHRONIC PAIN OF MULTIPLE JOINTS: ICD-10-CM

## 2022-03-10 RX ORDER — DULOXETIN HYDROCHLORIDE 60 MG/1
60 CAPSULE, DELAYED RELEASE ORAL DAILY
Qty: 30 CAPSULE | Refills: 2 | Status: SHIPPED | OUTPATIENT
Start: 2022-03-10 | End: 2022-06-20

## 2022-03-10 RX ORDER — DULOXETIN HYDROCHLORIDE 60 MG/1
60 CAPSULE, DELAYED RELEASE ORAL DAILY
Qty: 30 CAPSULE | Refills: 2 | OUTPATIENT
Start: 2022-03-10

## 2022-03-10 NOTE — TELEPHONE ENCOUNTER
[facility] is requesting a refill on the following medication(s):  Requested Prescriptions     Pending Prescriptions Disp Refills    DULoxetine (CYMBALTA) 60 MG extended release capsule 30 capsule 2     Sig: Take 1 capsule by mouth daily       Last Visit Date (If Applicable):  28/12/5913    Next Visit Date:    3/17/2022

## 2022-03-10 NOTE — TELEPHONE ENCOUNTER
[facility] is calling to request a refill on the following medication(s):  Requested Prescriptions     Pending Prescriptions Disp Refills    DULoxetine (CYMBALTA) 60 MG extended release capsule 30 capsule 2     Sig: Take 1 capsule by mouth daily       Last Visit Date (If Applicable):  67/55/4178    Next Visit Date:    3/17/2022

## 2022-03-12 DIAGNOSIS — G89.29 CHRONIC PAIN OF MULTIPLE JOINTS: ICD-10-CM

## 2022-03-12 DIAGNOSIS — M25.50 CHRONIC PAIN OF MULTIPLE JOINTS: ICD-10-CM

## 2022-03-14 RX ORDER — DULOXETIN HYDROCHLORIDE 60 MG/1
CAPSULE, DELAYED RELEASE ORAL
Qty: 30 CAPSULE | Refills: 2 | OUTPATIENT
Start: 2022-03-14

## 2022-03-17 ENCOUNTER — OFFICE VISIT (OUTPATIENT)
Dept: FAMILY MEDICINE CLINIC | Age: 32
End: 2022-03-17
Payer: COMMERCIAL

## 2022-03-17 VITALS
DIASTOLIC BLOOD PRESSURE: 84 MMHG | BODY MASS INDEX: 21.2 KG/M2 | HEART RATE: 85 BPM | SYSTOLIC BLOOD PRESSURE: 124 MMHG | WEIGHT: 156.3 LBS | OXYGEN SATURATION: 98 %

## 2022-03-17 DIAGNOSIS — R79.89 ABNORMAL TSH: ICD-10-CM

## 2022-03-17 DIAGNOSIS — R61 CHRONIC NIGHT SWEATS: Primary | ICD-10-CM

## 2022-03-17 DIAGNOSIS — R21 BUTTERFLY RASH: ICD-10-CM

## 2022-03-17 DIAGNOSIS — G89.29 CHRONIC PAIN OF MULTIPLE JOINTS: ICD-10-CM

## 2022-03-17 DIAGNOSIS — M25.50 CHRONIC PAIN OF MULTIPLE JOINTS: ICD-10-CM

## 2022-03-17 DIAGNOSIS — R53.83 FATIGUE, UNSPECIFIED TYPE: ICD-10-CM

## 2022-03-17 PROCEDURE — 99214 OFFICE O/P EST MOD 30 MIN: CPT | Performed by: NURSE PRACTITIONER

## 2022-03-17 PROCEDURE — G8484 FLU IMMUNIZE NO ADMIN: HCPCS | Performed by: NURSE PRACTITIONER

## 2022-03-17 PROCEDURE — G8420 CALC BMI NORM PARAMETERS: HCPCS | Performed by: NURSE PRACTITIONER

## 2022-03-17 PROCEDURE — G8427 DOCREV CUR MEDS BY ELIG CLIN: HCPCS | Performed by: NURSE PRACTITIONER

## 2022-03-17 PROCEDURE — 1036F TOBACCO NON-USER: CPT | Performed by: NURSE PRACTITIONER

## 2022-03-17 ASSESSMENT — PATIENT HEALTH QUESTIONNAIRE - PHQ9
SUM OF ALL RESPONSES TO PHQ9 QUESTIONS 1 & 2: 0
1. LITTLE INTEREST OR PLEASURE IN DOING THINGS: 0
SUM OF ALL RESPONSES TO PHQ QUESTIONS 1-9: 0
2. FEELING DOWN, DEPRESSED OR HOPELESS: 0
SUM OF ALL RESPONSES TO PHQ QUESTIONS 1-9: 0

## 2022-03-17 ASSESSMENT — SLEEP AND FATIGUE QUESTIONNAIRES
HAVE YOU BEEN TOLD YOU SNORE, EVEN INTERMITTENTLY: 0
DO YOU WAKE UP TIRED, ARE YOU TIRED DURING THE DAY, OR DO YOU TAKE NAPS: 1
HOW LIKELY ARE YOU TO NOD OFF OR FALL ASLEEP WHILE LYING DOWN TO REST IN THE AFTERNOON WHEN CIRCUMSTANCES PERMIT: 1
SLEEP APNEA SCREENER SCORE: 1
HOW LIKELY ARE YOU TO NOD OFF OR FALL ASLEEP WHILE SITTING AND READING: 0
HOW LIKELY ARE YOU TO NOD OFF OR FALL ASLEEP WHEN YOU ARE A PASSENGER IN A CAR FOR AN HOUR WITHOUT A BREAK: 2
HAVE YOU EVER BEEN TOLD YOU STOP BREATHING OR HOLD YOUR BREATH WHILE SLEEPING: 0
HOW LIKELY ARE YOU TO NOD OFF OR FALL ASLEEP WHILE SITTING INACTIVE IN A PUBLIC PLACE: 1
ESS TOTAL SCORE: 7
DO YOU WAKE UP MORE THAN ONCE A NIGHT TO URINATE: 0
DO YOU HAVE CONGESTIVE HEART FAILURE (CHF), HYPERTENSION, CORONARY DISEASE, CARDIAC ARRYTHMIAS, DIABETES, OR HAD A STROKE: 0
HOW LIKELY ARE YOU TO NOD OFF OR FALL ASLEEP IN A CAR, WHILE STOPPED FOR A FEW MINUTES IN TRAFFIC: 0
HOW LIKELY ARE YOU TO NOD OFF OR FALL ASLEEP WHILE WATCHING TV: 2
HOW LIKELY ARE YOU TO NOD OFF OR FALL ASLEEP WHILE SITTING AND TALKING TO SOMEONE: 0
HOW LIKELY ARE YOU TO NOD OFF OR FALL ASLEEP WHILE SITTING QUIETLY AFTER LUNCH WITHOUT ALCOHOL: 1

## 2022-03-17 ASSESSMENT — ENCOUNTER SYMPTOMS
WHEEZING: 0
SHORTNESS OF BREATH: 0
COUGH: 0

## 2022-03-17 NOTE — PATIENT INSTRUCTIONS
Patient Education   Patient Education        Fibromyalgia: Care Instructions  Overview     Fibromyalgia is a painful condition that is not completely understood by medical experts. The cause of fibromyalgia is not known. It can make you feel tired and ache all over. It causes tender spots at specific points of the body that hurt only when you press on them. You may have trouble sleeping, as well as other symptoms. These problems can upset your work and home life. Symptoms tend to come and go, although they may never go away completely. Fibromyalgia does not harm your muscles, joints, or organs. Follow-up care is a key part of your treatment and safety. Be sure to make and go to all appointments, and call your doctor if you are having problems. It's also a good idea to know your test results and keep a list of the medicines you take. How can you care for yourself at home? · Exercise often. Walk, swim, or bike to help with pain and sleep problems and to make you feel better. · Try to get a good night's sleep. Go to bed and get up at the same time each day, whether you feel rested or not. Make sure you have a good mattress and pillow. · Reduce stress. Avoid things that cause you stress, if you can. If not, work at making them less stressful. Learn to use biofeedback, guided imagery, meditation, or other methods to relax. · Make healthy changes. Eat a balanced diet, quit smoking, and limit alcohol and caffeine. · Use a heating pad set on low or take warm baths or showers for pain. Using cold packs for up to 20 minutes at a time can also relieve pain. Put a thin cloth between the cold pack and your skin. A gentle massage might help too. · Be safe with medicines. Take your medicines exactly as prescribed. Call your doctor if you think you are having a problem with your medicine. Your doctor may talk to you about taking antidepressant medicines.  These medicines may improve sleep, relieve pain, and in some cases treat depression. · Learn about fibromyalgia. This makes coping easier. Then, take an active role in your treatment. · Think about joining a support group with others who have fibromyalgia to learn more and get support. When should you call for help? Watch closely for changes in your health, and be sure to contact your doctor if:    · You feel sad, helpless, or hopeless; lose interest in things you used to enjoy; or have other symptoms of depression.     · Your fibromyalgia symptoms get worse. Where can you learn more? Go to https://ClusterFlunkluz elena.righTune. org and sign in to your [employer] account. Enter V003 in the [employer] box to learn more about \"Fibromyalgia: Care Instructions. \"     If you do not have an account, please click on the \"Sign Up Now\" link. Current as of: April 8, 2021               Content Version: 13.1  © 6330-6629 [employer]. Care instructions adapted under license by [facility] [employer] [facility] ([facility]). If you have questions about a medical condition or this instruction, always ask your healthcare professional. [address] any warranty or liability for your use of this information. Learning About Postural Orthostatic Tachycardia Syndrome (POTS)  What is POTS? Postural orthostatic tachycardia syndrome (POTS) is a fast heart rate (tachycardia) that starts after you stand up. This can cause symptoms such as dizziness or weakness. What happens when you have POTS? With POTS, the body does not control the heart rate as it should after you stand up. The change in heart rate happens within 10 minutes of standing up. This leads to symptoms such as dizziness, palpitations, trembling, or weakness. It's not known exactly why symptoms happen. People with severe symptoms may find it hard to keep up with daily living. But treatment can help. What are the symptoms?   Soon after you stand up, you may have symptoms such as:  · A fast, pounding heartbeat (palpitations). · Trembling, dizziness, weakness, or lightheadedness. · Feeling faint or very tired. With POTS, you may also have problems with:  · Blurred vision, headaches, nausea, and diarrhea. · Trouble sleeping and feeling anxious. · Keeping your attention focused. Symptoms can range from mild to severe. Some things can make symptoms worse. These include heat, menstrual cycle, dehydration, alcohol, exercise, and standing for a long time. When you first notice symptoms, lying down may help you feel better. What causes it? Experts don't understand what causes it, but different body systems seem to be out of balance. POTS may follow certain triggers such as a viral illness, a surgery, or pregnancy. How is POTS diagnosed? To learn what is causing your symptoms, your doctor may:  · Ask about your symptoms, including when and how they started. · Check how your blood pressure and heart rate change when you move from lying down to sitting to standing. · Do blood tests. · Check your heart with an electrocardiogram (EKG). You may have other tests such as a tilt table test. This test checks how your body responds when you change positions. How is POTS treated? Work with your doctor to find the right mix of treatments to help relieve symptoms and improve your quality of life. These treatments may include:  · Taking medicine prescribed by your doctor. For some people, taking medicine that affects blood pressure can help. Taking medicine that keeps the body's fluids balanced may also help. · Everyday self-care. These practices can be a zavala part of helping the body get back in balance. ? Drink plenty of fluids. For many people, low body fluid is part of what makes POTS symptoms worse. ? Eat the amount of salt your doctor tells you to. Salt helps keep up the body's fluid level. ? Try a special exercise program. Your doctor may give you a program of specific exercises.  You might start short and slow, especially if fatigue is a problem. Then you can add a little at a time. At first, you may only do exercise when you're reclined. Or you may try swimming. After a while, you start to add upright exercise. ? Wear compression stockings if your doctor recommends them. ? Keep track of your symptoms and what makes them better and worse. When should you call for help? Call 911 anytime you think you may need emergency care. For example, call if:  · You passed out (lost consciousness), and it feels different than your typical episode or you don't recover as quickly as you have in the past.  Call your doctor now or seek immediate medical care if:  · Your symptoms are getting worse. For example, you are more dizzy or lightheaded. Watch closely for changes in your health, and be sure to contact your doctor if:  · You do not get better as expected. Follow-up care is a key part of your treatment and safety. Be sure to make and go to all appointments, and call your doctor if you are having problems. It's also a good idea to know your test results and keep a list of the medicines you take. Where can you learn more? Go to https://E2E NetworkspeTruevision.Amakem. org and sign in to your [employer] account. Enter H062 in the [employer] box to learn more about \"Learning About Postural Orthostatic Tachycardia Syndrome (POTS). \"     If you do not have an account, please click on the \"Sign Up Now\" link. Current as of: April 29, 2021               Content Version: 13.1  © 2006-2021 Healthwise, Incorporated. Care instructions adapted under license by [facility] [place] ([facility]). If you have questions about a medical condition or this instruction, always ask your healthcare professional. [place] [address] any warranty or liability for your use of this information.

## 2022-03-17 NOTE — PROGRESS NOTES
[address] [address] [address]  Dept: [phone]  Dept Fax: [phone]    History and Physical  Patient:  Nina Herman  YOB: 1990  Date of Service:  3/17/2022    Subjective:   Nina Herman (:  1990) is a 32 y.o. male, Established patient, here for evaluation of the following chief complaint(s):    Chief Complaint   Patient presents with    Fatigue     does report appetite has improved did gain some weight, c/o still having night sweats and during day get chills, duloxetine has helped with aches still has some slight pain but still feels like something is off was also started on iron bid and has helped some but does take some time before feels effects      Patient presents to the office for follow up of chronic fatigue and night sweats. He continues to have 1-2 episodes nightly, occasionally will have them during the day. He has had an extensive workup: labs for rheumatological disease, inflammatory markers, paraproteinemia work up, CT chest/abdomen, and bone marrow biopsy; all unrevealing. Patient is wondering about the next step for further evaluation of symptoms. Fatigue  This is a chronic problem. The current episode started more than 1 month ago. The problem occurs daily. The problem has been gradually improving. Associated symptoms include arthralgias (multiple joint), chills, congestion, diaphoresis, fatigue, myalgias (improving) and a rash (face). Pertinent negatives include no abdominal pain, chest pain, coughing, fever, headaches, sore throat or weakness. Nothing aggravates the symptoms. He has tried rest, sleep, NSAIDs and acetaminophen for the symptoms. The treatment provided no relief.      Sleep Medicine 3/17/2022   Sitting and reading 0   Watching TV 2   Sitting, inactive in a public place (e.g. a theatre or a meeting) 1   As a passenger in a car for an hour without a break 2   Lying down to rest in the afternoon when circumstances permit 1   Sitting and talking to someone 0   Sitting quietly after a lunch without alcohol 1   In a car, while stopped for a few minutes in traffic 0   Total score 7       The ASCVD Risk score (Mervat Bryant., et al., 2013) failed to calculate for the following reasons: The 2013 ASCVD risk score is only valid for ages 36 to 78     BP Readings from Last 3 Encounters:   03/17/22 124/84   01/28/22 110/60   01/06/22 121/68      Pulse Readings from Last 3 Encounters:   03/17/22 85   01/28/22 79   01/06/22 59      Wt Readings from Last 3 Encounters:   03/17/22 156 lb 4.8 oz (70.9 kg)   01/28/22 149 lb 9.6 oz (67.9 kg)   01/06/22 150 lb 3.2 oz (68.1 kg)        Allergies   Allergen Reactions    Pecan Nut (Diagnostic) Itching, Rash and Shortness Of Breath    Flagyl [Metronidazole] Other (See Comments)     Confusion         Current Outpatient Medications   Medication Sig Dispense Refill    DULoxetine (CYMBALTA) 60 MG extended release capsule Take 1 capsule by mouth daily 30 capsule 2    naproxen (NAPROSYN) 500 MG tablet Take 1 tablet by mouth 2 times daily (with meals) 30 tablet 1    cetirizine (ZYRTEC) 10 MG tablet Take 10 mg by mouth daily       Budesonide (RHINOCORT ALLERGY NA) 1 spray by Nasal route 2 times daily       metroNIDAZOLE (METROCREAM) 0.75 % cream Apply topically 2 times daily. (Patient not taking: Reported on 1/28/2022) 60 g 0     No current facility-administered medications for this visit. Past Medical History:   Diagnosis Date    Irritable bowel syndrome     GI doctor in [place] was treating IBS       No past surgical history on file.   Family History   Problem Relation Age of Onset   Duana Big Cancer Father         brain cancer in remission ([id number])    Other Father         ruptured gall bladder had removed    Diabetes Maternal Grandfather     Heart Attack Paternal Grandfather 80     Social History     Tobacco Use    Smoking status: Never Smoker    Smokeless tobacco: Never Used   Vaping Use    Vaping Use: Former    Quit date: 1/1/2016   Substance Use Topics    Alcohol use: Yes     Comment: occasional    Drug use: Yes     Types: Marijuana (Weed)     Comment: socially       Review of Systems:     Review of Systems   Constitutional: Positive for chills, diaphoresis and fatigue. Negative for appetite change (improved) and fever. HENT: Positive for congestion. Negative for sore throat. Eyes: Negative. Respiratory: Negative for cough, shortness of breath and wheezing. Cardiovascular: Negative for chest pain, palpitations and leg swelling. Gastrointestinal: Negative for abdominal pain, constipation and diarrhea. Endocrine: Negative for cold intolerance, heat intolerance, polydipsia, polyphagia and polyuria. Genitourinary: Negative. Musculoskeletal: Positive for arthralgias (multiple joint) and myalgias (improving). Skin: Positive for rash (face). Allergic/Immunologic: Positive for environmental allergies. Negative for food allergies. Neurological: Negative for dizziness, weakness, light-headedness and headaches. Psychiatric/Behavioral: Positive for decreased concentration (occasional). Negative for agitation, dysphoric mood, self-injury, sleep disturbance and suicidal ideas. The patient is not nervous/anxious. PHQ Scores 3/17/2022 10/13/2020 2/6/2019 10/8/2018   PHQ2 Score 0 0 0 0   PHQ9 Score 0 0 0 0     Interpretation of Total Score Depression Severity: 1-4 = Minimal depression, 5-9 = Mild depression, 10-14 = Moderate depression, 15-19 = Moderately severe depression, 20-27 = Severe depression     Physical Exam:     Vitals:    03/17/22 0853 03/17/22 1014 03/17/22 1015   BP: 134/82 128/76 124/84   Position: Sitting Supine Standing   Pulse: 85     SpO2: 98%     Weight: 156 lb 4.8 oz (70.9 kg)        Body mass index is 21.2 kg/m². Physical Exam  Constitutional:       Appearance: Normal appearance. He is well-developed and well-groomed.    HENT: Head: Normocephalic. Eyes:      Conjunctiva/sclera: Conjunctivae normal.   Neck:      Thyroid: No thyromegaly. Vascular: No carotid bruit. Cardiovascular:      Rate and Rhythm: Normal rate and regular rhythm. Heart sounds: Normal heart sounds. Pulmonary:      Effort: Pulmonary effort is normal.      Breath sounds: Normal breath sounds. No wheezing. Abdominal:      General: Bowel sounds are normal.      Palpations: Abdomen is soft. Tenderness: There is no abdominal tenderness. Musculoskeletal:      Cervical back: Neck supple. Right lower leg: No edema. Left lower leg: No edema. Lymphadenopathy:      Cervical: No cervical adenopathy. Skin:     Capillary Refill: Capillary refill takes less than 2 seconds. Neurological:      Mental Status: He is alert and oriented to person, place, and time. Motor: No weakness. Gait: Gait normal.   Psychiatric:         Mood and Affect: Mood normal.         Behavior: Behavior is cooperative. Lab Results   Component Value Date    WBC 8.5 03/21/2022    HGB 15.3 03/21/2022    HCT 46.2 03/21/2022    .4 (H) 03/21/2022    .0 03/21/2022     Lab Results   Component Value Date     03/21/2022    K 4.0 03/21/2022    CL 98 03/21/2022    CO2 32 (H) 03/21/2022    BUN 9 03/21/2022    CREATININE 1.0 03/21/2022    GLUCOSE 96 03/21/2022    CALCIUM 9.6 03/21/2022    PROT 7.8 12/09/2021    LABALBU 4.9 12/09/2021    BILITOT 0.33 12/09/2021    ALKPHOS 67 12/09/2021    AST 19 12/09/2021    ALT 16 12/09/2021    LABGLOM > 60.0 03/21/2022    GFRAA >60 12/09/2021    GLOB 2.9 12/09/2021       Lab Results   Component Value Date    TSHFT4 1.35 03/21/2022    TSH 1.24 05/03/2019       Assessment/Plan:   1. Chronic night sweats  -     TSH with Reflex; Future  -     Ferritin; Future  -     CBC with Auto Differential; Future  -     Iron and TIBC; Future  -     Basic Metabolic Panel;  Future  -     [facility] - Keo Hurtado MD, Rheumatology, [place]  2. Chronic pain of multiple joints  -     DARLENE Nails MD, Rheumatology, [place]  3. Fatigue, unspecified type  -     TSH with Reflex; Future  -     Ferritin; Future  -     CBC with Auto Differential; Future  -     Iron and TIBC; Future  -     Basic Metabolic Panel; Future  -     DARLENE Nails MD, Rheumatology, [place]  4. Butterfly rash  -     TSH with Reflex; Future  -     CBC with Auto Differential; Future  -     Basic Metabolic Panel; Future  -     DARLENE Nails MD, Rheumatology, [place]  5. Abnormal TSH  -     TSH with Reflex; Future  -     DARLENE Nails MD, Rheumatology, [place]     Will repeat iron studies, TSH, CBC, and BMP. Discussed fibromyalgia, and POTS syndrome as differentials. Referral placed to rheumatology for further evaluation. All patient questions answered. Patient voiced understanding. Instructed to continue current medications. Patient agreed with treatment plan. Follow up as directed. No follow-ups on file. Please note that this chart was generated using voice recognition Dragon dictation software. Although every effort was made to ensure the accuracy of this automated transcription, some errors in transcription may have occurred.     Electronically signed by VINCE White CNP on 3/26/2022

## 2022-03-21 ENCOUNTER — NURSE ONLY (OUTPATIENT)
Dept: FAMILY MEDICINE CLINIC | Age: 32
End: 2022-03-21

## 2022-03-21 DIAGNOSIS — Z11.1 PPD SCREENING TEST: Primary | ICD-10-CM

## 2022-03-21 LAB
ANION GAP SERPL CALCULATED.3IONS-SCNC: 10 MMOL/L
BASOPHILS %: 1.5 (ref 0–3)
BASOPHILS ABSOLUTE: 0.13 (ref 0–0.3)
BUN BLDV-MCNC: 9 MG/DL (ref 9–20)
CALCIUM SERPL-MCNC: 9.6 MG/DL (ref 8.4–10.2)
CHLORIDE BLD-SCNC: 98 MMOL/L (ref 98–120)
CO2: 32 MMOL/L (ref 22–31)
CREAT SERPL-MCNC: 1 MG/DL (ref 0.7–1.3)
EOSINOPHILS %: 1.2 (ref 0–10)
EOSINOPHILS ABSOLUTE: 0.1 (ref 0–1.1)
FERRITIN: 64.4 NG/DL (ref 16–323)
GFR CALCULATED: > 60
GLUCOSE: 96 MG/DL (ref 75–110)
HCT VFR BLD CALC: 46.2 % (ref 42–52)
HEMOGLOBIN: 15.3 (ref 13.8–17.8)
IRON SATURATION: 63 % (ref 15–38)
IRON: 191 MG/DL (ref 49–181)
LYMPHOCYTE %: 16.24 (ref 20–51.1)
LYMPHOCYTES ABSOLUTE: 1.38 (ref 1–5.5)
MCH RBC QN AUTO: 33.3 PG (ref 28.5–32.5)
MCHC RBC AUTO-ENTMCNC: 33.2 G/DL (ref 32–37)
MCV RBC AUTO: 100.4 FL (ref 80–94)
MONOCYTES %: 7.77 (ref 1.7–9.3)
MONOCYTES ABSOLUTE: 0.66 (ref 0.1–1)
NEUTROPHILS %: 73.28 (ref 42.2–75.2)
NEUTROPHILS ABSOLUTE: 6.24 (ref 2–8.1)
PDW BLD-RTO: 12.2 % (ref 10–15.5)
PLATELET # BLD: 256 THOU/MM3 (ref 130–400)
POTASSIUM SERPL-SCNC: 4 MMOL/L (ref 3.6–5)
RBC: 4.6 M/UL (ref 4.7–6.1)
SODIUM BLD-SCNC: 136 MMOL/L (ref 135–145)
TOTAL IRON BINDING CAPACITY: 305 MG/DL (ref 261–497)
TSH REFLEX FT4: 1.35 MIU/ML (ref 0.49–4.67)
WBC: 8.5 THOU/ML3 (ref 4.8–10.8)

## 2022-03-21 NOTE — PROGRESS NOTES
Tuberculin skin test applied to right ventral forearm. Explained how to read the test, measuring induration not just erythema; he will come into office if test appears positive.

## 2022-03-23 ENCOUNTER — NURSE ONLY (OUTPATIENT)
Dept: FAMILY MEDICINE CLINIC | Age: 32
End: 2022-03-23

## 2022-03-23 DIAGNOSIS — Z11.1 PPD SCREENING TEST: Primary | ICD-10-CM

## 2022-03-23 NOTE — PROGRESS NOTES
PPD Reading Note  PPD read and results entered in [employer]. Result: 0.0 mm induration.   Interpretation: negative  If test not read within 48-72 hours of initial placement, patient advised to repeat in other arm 1-3 weeks after this test.  Allergic reaction: no

## 2022-03-26 PROBLEM — R61 CHRONIC NIGHT SWEATS: Status: ACTIVE | Noted: 2021-10-23

## 2022-03-26 PROBLEM — R79.89 ABNORMAL TSH: Status: ACTIVE | Noted: 2022-03-26

## 2022-03-26 ASSESSMENT — ENCOUNTER SYMPTOMS
SORE THROAT: 0
EYES NEGATIVE: 1
ABDOMINAL PAIN: 0
DIARRHEA: 0
CONSTIPATION: 0

## 2022-03-28 ENCOUNTER — TELEPHONE (OUTPATIENT)
Dept: FAMILY MEDICINE CLINIC | Age: 32
End: 2022-03-28

## 2022-03-28 NOTE — TELEPHONE ENCOUNTER
----- Message from Karan Davis sent at 3/28/2022  8:37 AM EDT -----  Subject: Results Request    QUESTIONS  Which lab or imaging result is the patient calling about? all labs ordered  Which provider ordered the test? Julito Yoder   At what location was the test performed? Date the test was performed? 2022-03-23  Additional Information for Provider? Patient would like to know the   results of these tests, stated his lower body is becoming more painful and   would like to know the next steps   ---------------------------------------------------------------------------  --------------  CALL BACK INFO  What is the best way for the office to contact you? OK to leave message on   voicemail  Preferred Call Back Phone Number?  [phone]

## 2022-03-29 NOTE — TELEPHONE ENCOUNTER
Please notify patient most recent labs results look okay. Referral already placed to rheumatology as discussed during appointment.

## 2022-06-20 DIAGNOSIS — M25.50 CHRONIC PAIN OF MULTIPLE JOINTS: ICD-10-CM

## 2022-06-20 DIAGNOSIS — G89.29 CHRONIC PAIN OF MULTIPLE JOINTS: ICD-10-CM

## 2022-06-20 RX ORDER — DULOXETIN HYDROCHLORIDE 60 MG/1
60 CAPSULE, DELAYED RELEASE ORAL DAILY
Qty: 30 CAPSULE | Refills: 2 | OUTPATIENT
Start: 2022-06-20

## 2022-06-20 RX ORDER — DULOXETIN HYDROCHLORIDE 60 MG/1
CAPSULE, DELAYED RELEASE ORAL
Qty: 30 CAPSULE | Refills: 2 | Status: SHIPPED | OUTPATIENT
Start: 2022-06-20 | End: 2022-09-08 | Stop reason: SDUPTHER

## 2022-06-20 NOTE — TELEPHONE ENCOUNTER
Logan Renee is requesting a refill on the following medication(s):  Requested Prescriptions     Pending Prescriptions Disp Refills    DULoxetine (CYMBALTA) 60 MG extended release capsule [Pharmacy Med Name: DULOXETINE HCL DR 60 MG CAP] 30 capsule 2     Sig: take 1 capsule by mouth once daily       Last Visit Date (If Applicable):  0/78/4447    Next Visit Date:    Visit date not found

## 2022-06-20 NOTE — TELEPHONE ENCOUNTER
Michael Francois is requesting a refill on the following medication(s):  Requested Prescriptions     Pending Prescriptions Disp Refills    DULoxetine (CYMBALTA) 60 MG extended release capsule 30 capsule 2     Sig: Take 1 capsule by mouth daily       Last Visit Date (If Applicable):  1/39/2261    Next Visit Date:    Visit date not found

## 2022-08-04 ENCOUNTER — OFFICE VISIT (OUTPATIENT)
Dept: FAMILY MEDICINE CLINIC | Age: 32
End: 2022-08-04
Payer: COMMERCIAL

## 2022-08-04 VITALS
DIASTOLIC BLOOD PRESSURE: 78 MMHG | HEART RATE: 60 BPM | WEIGHT: 156 LBS | OXYGEN SATURATION: 97 % | SYSTOLIC BLOOD PRESSURE: 130 MMHG | BODY MASS INDEX: 21.16 KG/M2

## 2022-08-04 DIAGNOSIS — R26.9 ABNORMAL GAIT: ICD-10-CM

## 2022-08-04 DIAGNOSIS — R61 CHRONIC NIGHT SWEATS: ICD-10-CM

## 2022-08-04 DIAGNOSIS — M25.50 CHRONIC PAIN OF MULTIPLE JOINTS: ICD-10-CM

## 2022-08-04 DIAGNOSIS — R63.0 DECREASED APPETITE: ICD-10-CM

## 2022-08-04 DIAGNOSIS — G93.32 CHRONIC FATIGUE SYNDROME: ICD-10-CM

## 2022-08-04 DIAGNOSIS — R29.90 ABNORMAL NEUROLOGICAL EXAM: Primary | ICD-10-CM

## 2022-08-04 DIAGNOSIS — G89.29 CHRONIC PAIN OF MULTIPLE JOINTS: ICD-10-CM

## 2022-08-04 LAB
ANION GAP SERPL CALCULATED.3IONS-SCNC: 9.1 MMOL/L
BUN BLDV-MCNC: 10 MG/DL (ref 9–20)
CALCIUM SERPL-MCNC: 9.4 MG/DL (ref 8.4–10.2)
CHLORIDE BLD-SCNC: 100 MMOL/L (ref 98–120)
CO2: 28 MMOL/L (ref 22–31)
CREAT SERPL-MCNC: 1.1 MG/DL (ref 0.7–1.3)
GFR CALCULATED: > 60
GLUCOSE: 83 MG/DL (ref 75–110)
POTASSIUM SERPL-SCNC: 3.9 MMOL/L (ref 3.6–5)
SODIUM BLD-SCNC: 137 MMOL/L (ref 135–145)

## 2022-08-04 PROCEDURE — G8427 DOCREV CUR MEDS BY ELIG CLIN: HCPCS | Performed by: NURSE PRACTITIONER

## 2022-08-04 PROCEDURE — 99214 OFFICE O/P EST MOD 30 MIN: CPT | Performed by: NURSE PRACTITIONER

## 2022-08-04 PROCEDURE — 1036F TOBACCO NON-USER: CPT | Performed by: NURSE PRACTITIONER

## 2022-08-04 PROCEDURE — G8420 CALC BMI NORM PARAMETERS: HCPCS | Performed by: NURSE PRACTITIONER

## 2022-08-04 RX ORDER — FERROUS SULFATE 325(65) MG
325 TABLET ORAL
COMMUNITY

## 2022-08-04 NOTE — PROGRESS NOTES
[address] [address] [address]  Dept: [phone]  Dept Fax: [phone]    History and Physical  Patient:  Liberty Vázquez  YOB: 1990  Date of Service:  2022    Subjective:   Liberty Vázquez (:  1990) is a 32 y.o. male, Established patient, here for evaluation of the following chief complaint(s):    Chief Complaint   Patient presents with    Rash     Reports having a new rash in midsection above belt buckel    Neurologic Problem     F/u from rheumatology appointment, reports having ha's, was experiencing vomiting but has had iron decreased and has improved some      Patient presents to the office for follow up of ongoing fatigue, generalized joint aches, night sweats and decreased appetite. He has undergone an extensive workup with oncology, including a bone marrow biopsy and most recently rheumatology without a definitive diagnosis. Patient states his recent SAVAGE was positive. He has noticed an increase in headaches \"pressure\" and \"slowing\" with processing. Lower body feels heavy and also feels a little unsteady at times. He has not had any falls. Rash  This is a new problem. The current episode started 1 to 4 weeks ago (2 weeks ago). The problem has been gradually improving since onset. The affected locations include the abdomen (on left). The rash is characterized by itchiness (with improvement). He was exposed to nothing. Associated symptoms include fatigue. Pertinent negatives include no congestion, cough, diarrhea, fever, rhinorrhea or shortness of breath. Past treatments include nothing. The ASCVD Risk score (Omar Bailey, et al., 2013) failed to calculate for the following reasons:     The 2013 ASCVD risk score is only valid for ages 36 to 78     BP Readings from Last 3 Encounters:   22 130/78   22 124/84   22 110/60      Pulse Readings from Last 3 Encounters:   22 60   22 85   01/28/22 79      Wt Readings from Last 3 Encounters:   08/04/22 156 lb (70.8 kg)   03/17/22 156 lb 4.8 oz (70.9 kg)   01/28/22 149 lb 9.6 oz (67.9 kg)        Allergies   Allergen Reactions    Pecan Nut (Diagnostic) Itching, Rash and Shortness Of Breath    Flagyl [Metronidazole] Other (See Comments)     Confusion         Current Outpatient Medications   Medication Sig Dispense Refill    ferrous sulfate (IRON 325) 325 (65 Fe) MG tablet Take 325 mg by mouth daily (with breakfast)      DULoxetine (CYMBALTA) 60 MG extended release capsule take 1 capsule by mouth once daily 30 capsule 2    cetirizine (ZYRTEC) 10 MG tablet Take 10 mg by mouth daily       Budesonide (RHINOCORT ALLERGY NA) 1 spray by Nasal route 2 times daily        No current facility-administered medications for this visit. Past Medical History:   Diagnosis Date    Irritable bowel syndrome     GI doctor in [place] was treating IBS       No past surgical history on file. Family History   Problem Relation Age of Onset    Cancer Father         brain cancer in remission ([id number])    Other Father         ruptured gall bladder had removed    Diabetes Maternal Grandfather     Heart Attack Paternal Grandfather 80     Social History     Tobacco Use    Smoking status: Never    Smokeless tobacco: Never   Vaping Use    Vaping Use: Former    Quit date: 1/1/2016   Substance Use Topics    Alcohol use: Yes     Comment: occasional    Drug use: Yes     Types: Marijuana (Weed)     Comment: socially       Review of Systems:     Review of Systems   Constitutional:  Positive for fatigue. Negative for chills and fever. HENT:  Negative for congestion and rhinorrhea. Respiratory:  Negative for cough, shortness of breath and wheezing. Cardiovascular:  Negative for chest pain and palpitations. Gastrointestinal:  Negative for diarrhea. Musculoskeletal:  Positive for arthralgias, gait problem (lower body feels heavy) and myalgias.    Skin:  Positive for rash (abdomen). Allergic/Immunologic: Positive for environmental allergies. Neurological:  Positive for headaches. Hematological:  Negative for adenopathy. Does not bruise/bleed easily. PHQ Scores 3/17/2022 10/13/2020 2/6/2019 10/8/2018   PHQ2 Score 0 0 0 0   PHQ9 Score 0 0 0 0     Interpretation of Total Score Depression Severity: 1-4 = Minimal depression, 5-9 = Mild depression, 10-14 = Moderate depression, 15-19 = Moderately severe depression, 20-27 = Severe depression     Physical Exam:     Vitals:    08/04/22 1202   BP: 130/78   Pulse: 60   SpO2: 97%   Weight: 156 lb (70.8 kg)      Body mass index is 21.16 kg/m². Physical Exam  Constitutional:       Appearance: Normal appearance. He is well-developed and well-groomed. HENT:      Head: Normocephalic. Eyes:      Conjunctiva/sclera: Conjunctivae normal.      Pupils: Pupils are equal, round, and reactive to light. Neck:      Thyroid: No thyromegaly. Vascular: No carotid bruit. Cardiovascular:      Rate and Rhythm: Normal rate and regular rhythm. Heart sounds: Normal heart sounds. Pulmonary:      Effort: Pulmonary effort is normal.      Breath sounds: Normal breath sounds. No wheezing. Abdominal:      General: Bowel sounds are normal.      Palpations: Abdomen is soft. Tenderness: There is no abdominal tenderness. Musculoskeletal:      Cervical back: Neck supple. Right lower leg: No edema. Left lower leg: No edema. Lymphadenopathy:      Cervical: No cervical adenopathy. Skin:     Capillary Refill: Capillary refill takes less than 2 seconds. Findings: No rash. Neurological:      Mental Status: He is alert and oriented to person, place, and time. Motor: No weakness or tremor. Coordination: Heel to Shin Test abnormal. Finger-Nose-Finger Test normal.      Gait: Gait abnormal and tandem walk abnormal.   Psychiatric:         Mood and Affect: Mood normal.         Behavior: Behavior is cooperative. Assessment/Plan:   1. Abnormal neurological exam  -     Marcel Jennings MD, Neurology, [place]  -     [address]; Future  -     Basic Metabolic Panel; Future  2. Abnormal gait  -     Marcel Jennings MD, Neurology, [place]  3. Chronic pain of multiple joints  -     CT HEAD W WO CONTRAST; Future  4. Chronic night sweats  -     CT HEAD W WO CONTRAST; Future  -     Basic Metabolic Panel; Future  5. Decreased appetite  -     CT HEAD W WO CONTRAST; Future  6. Chronic fatigue syndrome      All patient questions answered. Patient voiced understanding. Instructed to continue current medications. Patient agreed with treatment plan. Follow up as directed. Return if symptoms worsen or fail to improve. Please note that this chart was generated using voice recognition Dragon dictation software. Although every effort was made to ensure the accuracy of this automated transcription, some errors in transcription may have occurred.     Electronically signed by VINCE Long CNP on 8/8/2022

## 2022-08-08 PROBLEM — G93.32 CHRONIC FATIGUE SYNDROME: Status: ACTIVE | Noted: 2019-02-06

## 2022-08-08 PROBLEM — R26.9 ABNORMAL GAIT: Status: ACTIVE | Noted: 2022-08-08

## 2022-08-08 PROBLEM — R29.90 ABNORMAL NEUROLOGICAL EXAM: Status: ACTIVE | Noted: 2022-08-08

## 2022-08-08 ASSESSMENT — ENCOUNTER SYMPTOMS
SHORTNESS OF BREATH: 0
WHEEZING: 0
DIARRHEA: 0
RHINORRHEA: 0
COUGH: 0

## 2022-08-16 ENCOUNTER — OFFICE VISIT (OUTPATIENT)
Dept: NEUROLOGY | Age: 32
End: 2022-08-16
Payer: COMMERCIAL

## 2022-08-16 VITALS
BODY MASS INDEX: 21.54 KG/M2 | SYSTOLIC BLOOD PRESSURE: 100 MMHG | HEIGHT: 72 IN | WEIGHT: 159 LBS | HEART RATE: 50 BPM | DIASTOLIC BLOOD PRESSURE: 58 MMHG

## 2022-08-16 DIAGNOSIS — G47.30 SLEEP APNEA, UNSPECIFIED TYPE: ICD-10-CM

## 2022-08-16 DIAGNOSIS — R53.83 FATIGUE, UNSPECIFIED TYPE: Primary | ICD-10-CM

## 2022-08-16 DIAGNOSIS — R29.898 RIGHT LEG WEAKNESS: ICD-10-CM

## 2022-08-16 DIAGNOSIS — G62.9 NEUROPATHY: ICD-10-CM

## 2022-08-16 PROCEDURE — G8427 DOCREV CUR MEDS BY ELIG CLIN: HCPCS | Performed by: STUDENT IN AN ORGANIZED HEALTH CARE EDUCATION/TRAINING PROGRAM

## 2022-08-16 PROCEDURE — G8420 CALC BMI NORM PARAMETERS: HCPCS | Performed by: STUDENT IN AN ORGANIZED HEALTH CARE EDUCATION/TRAINING PROGRAM

## 2022-08-16 PROCEDURE — 1036F TOBACCO NON-USER: CPT | Performed by: STUDENT IN AN ORGANIZED HEALTH CARE EDUCATION/TRAINING PROGRAM

## 2022-08-16 PROCEDURE — 99214 OFFICE O/P EST MOD 30 MIN: CPT | Performed by: STUDENT IN AN ORGANIZED HEALTH CARE EDUCATION/TRAINING PROGRAM

## 2022-08-16 ASSESSMENT — ENCOUNTER SYMPTOMS
EYES NEGATIVE: 1
BACK PAIN: 1
GASTROINTESTINAL NEGATIVE: 1
RESPIRATORY NEGATIVE: 1

## 2022-08-16 NOTE — PROGRESS NOTES
[facility]  [address] [address] [facility] [address]  Dept: [phone]    PATIENT NAME: Willie Monroe  PATIENT MRN: [id number]  PRIMARY CARE PHYSICIAN: VINCE Yanez CNP    HPI:      Willie Monroe is a 32 y.o. male who presents to clinic today for evaluation of Neurologic Problem (Abnormal neuro exam and gait)     Patient notes symptoms started with GI issues about 4 years ago. He noted after 20-30 minutes of eating he would have a lot  of pain. He had GI evaluation and per patient notes all workup was negative. He was symptom free for 2 years and notes in 2019 he started have body aches, severe fatigue, joint paint in elbows and knees. He was referred to oncology and had a bone narrow biopsy which did not show cancer. Most recently, he started having numbness and tingling that starts in bilateral hips and radiates to feet. He describes soles of his feet as a pins and needles sensation. He notes he typically is very tired in the morning and has difficulty starting the day. He notes his body just feels fatigued. He was seen by rheumatology for a positive SAVAGE and was being tested for lupus but further tests were negative. He was referred to neurology for gait imbalance. He notes he for the past few months he has been stumbling and notes right foot drags more than left foot. He notes his balance is worse on uneven surfaces like grass. It is also difficulty to go up the stairs and notes his legs feel heavy. Patient notes he does have night sweats since November 2021. Patient does not snore, no apneic episodes. He does have morning headaches.      Family History:  Father had brain cancer(not sure which kind)    Labs reviewed:  TSH wnl  Iron panel wnl  Immunofixation normal  SPEP normal  ESR normal  CRP normal  SAVAGE screen negative  PREVIOUS WORKUP:     Past Medical History:   Diagnosis Date    Irritable bowel syndrome     GI doctor in [place] was treating IBS        History reviewed. No pertinent surgical history. Social History     Socioeconomic History    Marital status:      Spouse name: Not on file    Number of children: Not on file    Years of education: Not on file    Highest education level: Not on file   Occupational History    Not on file   Tobacco Use    Smoking status: Never    Smokeless tobacco: Never   Vaping Use    Vaping Use: Former    Quit date: 1/1/2016   Substance and Sexual Activity    Alcohol use: Yes     Comment: occasional    Drug use: Yes     Types: Marijuana (Weed)     Comment: socially    Sexual activity: Yes     Partners: Female     Comment: wife   Other Topics Concern    Not on file   Social History Narrative    Not on file     Social Determinants of Health     Financial Resource Strain: Not on file   Food Insecurity: Not on file   Transportation Needs: Not on file   Physical Activity: Not on file   Stress: Not on file   Social Connections: Not on file   Intimate Partner Violence: Not on file   Housing Stability: Not on file        Current Outpatient Medications   Medication Sig Dispense Refill    ferrous sulfate (IRON 325) 325 (65 Fe) MG tablet Take 325 mg by mouth daily (with breakfast)      DULoxetine (CYMBALTA) 60 MG extended release capsule take 1 capsule by mouth once daily 30 capsule 2    cetirizine (ZYRTEC) 10 MG tablet Take 10 mg by mouth daily       Budesonide (RHINOCORT ALLERGY NA) 1 spray by Nasal route 2 times daily        No current facility-administered medications for this visit. Allergies   Allergen Reactions    Pecan Nut (Diagnostic) Itching, Rash and Shortness Of Breath    Flagyl [Metronidazole] Other (See Comments)     Confusion          REVIEW OF SYSTEMS:     Review of Systems   Constitutional:  Positive for fatigue. HENT: Negative. Eyes: Negative. Respiratory: Negative. Cardiovascular: Negative. Gastrointestinal: Negative. Endocrine: Negative. Genitourinary: Negative.     Musculoskeletal:  Positive for back pain and gait problem. Skin: Negative. Neurological:  Positive for weakness and numbness. Negative for dizziness, tremors, seizures, syncope, facial asymmetry, speech difficulty, light-headedness and headaches. Hematological: Negative. Psychiatric/Behavioral:  Negative for sleep disturbance. NEUROLOGICAL EXAMINATION:   VITALS  BP (!) 100/58 (Site: Left Upper Arm, Position: Sitting, Cuff Size: Medium Adult)   Pulse 50   Ht 6' (1.829 m)   Wt 159 lb (72.1 kg)   BMI 21.56 kg/m²      Mental status    Alert and oriented x 3; intact memory with no confusion, speech or language problems; no hallucinations or delusions     Cranial nerves    II - visual fields intact to confrontation  III, IV, VI - extra-ocular muscles full: no pupillary defect; no LIAM, no nystagmus, no ptosis   V - normal facial sensation                                                               VII - normal facial symmetry                                                             VIII - intact hearing                                                                             IX, X - symmetrical palate                                                                  XI - symmetrical shoulder shrug                                                       XII - tongue midline without atrophy or fasciculation      Motor function  Normal muscle bulk and tone; strength 4/5 in RHF otherwise 5/5 throughout. Sensory function Decreased vibration bilaterally to ankle, decreased touch to pinprick up to mid calf bilaterally. Proprioception intact      Cerebellar Intact fine motor movement. No involuntary movements or tremors. No ataxia or dysmetria on finger to nose or heel to shin testing      Reflex function DTR 2+ on bilateral UE and 3+ LE, symmetric. Gait                   normal base and arm swing        ASSESSMENT / PLAN:   Savannah Kirkland is a 32 y.o. male that established care with neurology for gait imbalance.   On

## 2022-08-23 LAB
FOLATE: 11.9 NG/ML (ref 2.8–20)
HBA1C MFR BLD: 4.8 % (ref 4.4–6.4)
MAGNESIUM: 1.9 MG/DL (ref 1.6–2.3)
VITAMIN B-12: 469 PG/ML (ref 239–931)

## 2022-08-24 DIAGNOSIS — G62.9 NEUROPATHY: ICD-10-CM

## 2022-08-26 ENCOUNTER — HOSPITAL ENCOUNTER (OUTPATIENT)
Dept: MRI IMAGING | Age: 32
Discharge: HOME | End: 2022-08-28
Payer: COMMERCIAL

## 2022-08-26 DIAGNOSIS — R53.83 FATIGUE, UNSPECIFIED TYPE: ICD-10-CM

## 2022-08-26 DIAGNOSIS — R29.898 RIGHT LEG WEAKNESS: ICD-10-CM

## 2022-08-26 PROCEDURE — 72148 MRI LUMBAR SPINE W/O DYE: CPT

## 2022-08-26 PROCEDURE — A9579 GAD-BASE MR CONTRAST NOS,1ML: HCPCS | Performed by: STUDENT IN AN ORGANIZED HEALTH CARE EDUCATION/TRAINING PROGRAM

## 2022-08-26 PROCEDURE — 70553 MRI BRAIN STEM W/O & W/DYE: CPT

## 2022-08-26 PROCEDURE — 6360000004 HC RX CONTRAST MEDICATION: Performed by: STUDENT IN AN ORGANIZED HEALTH CARE EDUCATION/TRAINING PROGRAM

## 2022-08-26 RX ADMIN — GADOTERIDOL 15 ML: 279.3 INJECTION, SOLUTION INTRAVENOUS at 16:15

## 2022-08-28 LAB — VITAMIN B6: 230.4

## 2022-08-29 ENCOUNTER — TELEPHONE (OUTPATIENT)
Dept: NEUROLOGY | Age: 32
End: 2022-08-29

## 2022-08-29 DIAGNOSIS — G62.9 NEUROPATHY: ICD-10-CM

## 2022-08-29 NOTE — TELEPHONE ENCOUNTER
----- Message from Mercedes Hua MD sent at 8/29/2022  8:58 AM EDT -----  Patient know that MRI brain was normal.  MRI brain showed 1 single nonspecific spot on his MRI brain that does not have any significance at this time. MRI L-spine did show some mild degenerative disease at L5-S1 with a disc bulge causing some mild neural foraminal narrowing at L5 bilaterally. Recommend physical therapy if he has not already had physical therapy. His labs so far revealed  an elevated B6 level which can cause numbness and tingling in his feet. If he is taking any B6 supplements or any multivitamin that contain B6 he should stop.

## 2022-08-29 NOTE — TELEPHONE ENCOUNTER
Call placed to the patient and this information given. Patient would like to pursue PT. Patient informed that I would let Dr. Simi Fletcher know and she would place the order. Patient confirmed that he would like to go to [facility] in [facility]. I let him know that I would fax the order to the above facility when it was done. I asked that he let me know if there were any problems. Patient verbally stated his understanding.

## 2022-08-30 DIAGNOSIS — R29.898 RIGHT LEG WEAKNESS: Primary | ICD-10-CM

## 2022-09-08 DIAGNOSIS — M25.50 CHRONIC PAIN OF MULTIPLE JOINTS: ICD-10-CM

## 2022-09-08 DIAGNOSIS — G89.29 CHRONIC PAIN OF MULTIPLE JOINTS: ICD-10-CM

## 2022-09-09 RX ORDER — DULOXETIN HYDROCHLORIDE 60 MG/1
60 CAPSULE, DELAYED RELEASE ORAL DAILY
Qty: 90 CAPSULE | Refills: 2 | Status: SHIPPED | OUTPATIENT
Start: 2022-09-09 | End: 2022-09-12 | Stop reason: SDUPTHER

## 2022-09-09 NOTE — TELEPHONE ENCOUNTER
Carey Pinto is calling to request a refill on the following medication(s):  Requested Prescriptions     Pending Prescriptions Disp Refills    DULoxetine (CYMBALTA) 60 MG extended release capsule 30 capsule 2       Last Visit Date (If Applicable):  8/0/2337    Next Visit Date:    Visit date not found

## 2022-09-12 DIAGNOSIS — G89.29 CHRONIC PAIN OF MULTIPLE JOINTS: ICD-10-CM

## 2022-09-12 DIAGNOSIS — M25.50 CHRONIC PAIN OF MULTIPLE JOINTS: ICD-10-CM

## 2022-09-12 RX ORDER — DULOXETIN HYDROCHLORIDE 60 MG/1
60 CAPSULE, DELAYED RELEASE ORAL DAILY
Qty: 90 CAPSULE | Refills: 2 | Status: SHIPPED | OUTPATIENT
Start: 2022-09-12

## 2022-09-12 NOTE — TELEPHONE ENCOUNTER
Liberty Vázquez is calling to request a refill on the following medication(s):  Requested Prescriptions     Pending Prescriptions Disp Refills    DULoxetine (CYMBALTA) 60 MG extended release capsule 90 capsule 2     Sig: Take 1 capsule by mouth daily       Last Visit Date (If Applicable):  8/5/1290    Next Visit Date:    Visit date not found

## 2022-09-15 ENCOUNTER — HOSPITAL ENCOUNTER (OUTPATIENT)
Dept: SLEEP CENTER | Age: 32
Discharge: HOME | End: 2022-09-17
Payer: COMMERCIAL

## 2022-09-15 DIAGNOSIS — R53.83 FATIGUE, UNSPECIFIED TYPE: ICD-10-CM

## 2022-09-15 DIAGNOSIS — G47.30 SLEEP APNEA, UNSPECIFIED TYPE: ICD-10-CM

## 2022-09-15 PROCEDURE — 95810 POLYSOM 6/> YRS 4/> PARAM: CPT

## 2022-09-16 VITALS
HEIGHT: 72 IN | OXYGEN SATURATION: 96 % | RESPIRATION RATE: 20 BRPM | BODY MASS INDEX: 21.54 KG/M2 | HEART RATE: 48 BPM | WEIGHT: 159 LBS

## 2022-09-26 LAB — STATUS: NORMAL

## 2022-09-26 PROCEDURE — 95810 POLYSOM 6/> YRS 4/> PARAM: CPT | Performed by: STUDENT IN AN ORGANIZED HEALTH CARE EDUCATION/TRAINING PROGRAM

## 2022-10-04 DIAGNOSIS — G47.30 SLEEP APNEA, UNSPECIFIED TYPE: Primary | ICD-10-CM

## 2022-11-03 ENCOUNTER — HOSPITAL ENCOUNTER (OUTPATIENT)
Dept: SLEEP CENTER | Age: 32
Discharge: HOME | End: 2022-11-05
Payer: COMMERCIAL

## 2022-11-03 DIAGNOSIS — G47.30 SLEEP APNEA, UNSPECIFIED TYPE: ICD-10-CM

## 2022-11-03 PROCEDURE — 95811 POLYSOM 6/>YRS CPAP 4/> PARM: CPT

## 2022-11-07 ENCOUNTER — OFFICE VISIT (OUTPATIENT)
Dept: NEUROLOGY | Age: 32
End: 2022-11-07
Payer: COMMERCIAL

## 2022-11-07 VITALS
WEIGHT: 161 LBS | HEART RATE: 61 BPM | HEIGHT: 72 IN | DIASTOLIC BLOOD PRESSURE: 85 MMHG | BODY MASS INDEX: 21.81 KG/M2 | SYSTOLIC BLOOD PRESSURE: 127 MMHG

## 2022-11-07 DIAGNOSIS — R26.81 GAIT INSTABILITY: ICD-10-CM

## 2022-11-07 DIAGNOSIS — M54.10 RADICULAR LOW BACK PAIN: ICD-10-CM

## 2022-11-07 DIAGNOSIS — G62.9 NEUROPATHY: Primary | ICD-10-CM

## 2022-11-07 DIAGNOSIS — G47.33 OBSTRUCTIVE SLEEP APNEA SYNDROME: ICD-10-CM

## 2022-11-07 PROCEDURE — 99214 OFFICE O/P EST MOD 30 MIN: CPT | Performed by: STUDENT IN AN ORGANIZED HEALTH CARE EDUCATION/TRAINING PROGRAM

## 2022-11-07 PROCEDURE — G8484 FLU IMMUNIZE NO ADMIN: HCPCS | Performed by: STUDENT IN AN ORGANIZED HEALTH CARE EDUCATION/TRAINING PROGRAM

## 2022-11-07 PROCEDURE — G8420 CALC BMI NORM PARAMETERS: HCPCS | Performed by: STUDENT IN AN ORGANIZED HEALTH CARE EDUCATION/TRAINING PROGRAM

## 2022-11-07 PROCEDURE — G8427 DOCREV CUR MEDS BY ELIG CLIN: HCPCS | Performed by: STUDENT IN AN ORGANIZED HEALTH CARE EDUCATION/TRAINING PROGRAM

## 2022-11-07 PROCEDURE — 1036F TOBACCO NON-USER: CPT | Performed by: STUDENT IN AN ORGANIZED HEALTH CARE EDUCATION/TRAINING PROGRAM

## 2022-11-07 RX ORDER — PREGABALIN 25 MG/1
25 CAPSULE ORAL 3 TIMES DAILY
Qty: 60 CAPSULE | Refills: 3 | Status: SHIPPED | OUTPATIENT
Start: 2022-11-07 | End: 2023-11-09

## 2022-11-07 ASSESSMENT — ENCOUNTER SYMPTOMS
RESPIRATORY NEGATIVE: 1
GASTROINTESTINAL NEGATIVE: 1
EYES NEGATIVE: 1
BACK PAIN: 1

## 2022-11-07 NOTE — PROGRESS NOTES
[address] [place] [address]  Dept: [phone]    PATIENT NAME: Anita Marshall  PATIENT MRN: [id number]  PRIMARY CARE PHYSICIAN: VINCE Thurman - CNP    HPI:      Anita Marshall is a 28 y.o. male who presents to clinic today for follow up. Interim History:  Since last clinic visit, patient had a sleep study which showed  mild obstructive sleep apnea. The Apnea-Hypopnea Index for the entire night was 8.0. The AHI was 8.1 during NREM sleep, compared to an AHI of 5.0 during REM. Poli Santoro MRI brain showed 1 single nonspecific spot on his MRI brain that does not have any significance at this time. MRI L-spine did show some mild degenerative disease at L5-S1 with a disc bulge causing some mild neural foraminal narrowing at L5 bilaterally. Patient was also noted to have an elevated B6 level. He physical therapy since his last clinic visit and notes it has improved his balance issues. He notes he still has paresthesias in his legs and notes it can be severe and notes it is difficult more with long distances. EMG has not been completed it but is scheduled in a few weeks. Prior History:  Patient notes symptoms started with GI issues about 4 years ago. He noted after 20-30 minutes of eating he would have a lot  of pain. He had GI evaluation and per patient notes all workup was negative. He was symptom free for 2 years and notes in 2019 he started have body aches, severe fatigue, joint paint in elbows and knees. He was referred to oncology and had a bone narrow biopsy which did not show cancer. Most recently, he started having numbness and tingling that starts in bilateral hips and radiates to feet. He describes soles of his feet as a pins and needles sensation. He notes he typically is very tired in the morning and has difficulty starting the day. He notes his body just feels fatigued.  He was seen by rheumatology for a positive SAVAGE and was being tested for lupus but further tests were negative. He was referred to neurology for gait imbalance. He notes he for the past few months he has been stumbling and notes right foot drags more than left foot. He notes his balance is worse on uneven surfaces like grass. It is also difficulty to go up the stairs and notes his legs feel heavy. Patient notes he does have night sweats since November 2021. Patient does not snore, no apneic episodes. He does have morning headaches. Family History:  Father had brain cancer(not sure which kind)    Labs reviewed:  TSH wnl  Iron panel wnl  Immunofixation normal  SPEP normal  ESR normal  CRP normal  SAVAGE screen negative  PREVIOUS WORKUP:     Past Medical History:   Diagnosis Date    Irritable bowel syndrome     GI doctor in [place] was treating IBS        History reviewed. No pertinent surgical history.      Social History     Socioeconomic History    Marital status:      Spouse name: Not on file    Number of children: Not on file    Years of education: Not on file    Highest education level: Not on file   Occupational History    Not on file   Tobacco Use    Smoking status: Never    Smokeless tobacco: Never   Vaping Use    Vaping Use: Former    Quit date: 1/1/2016   Substance and Sexual Activity    Alcohol use: Yes     Comment: occasional    Drug use: Yes     Types: Marijuana Claudia Cotton)     Comment: socially    Sexual activity: Yes     Partners: Female     Comment: wife   Other Topics Concern    Not on file   Social History Narrative    Not on file     Social Determinants of Health     Financial Resource Strain: Not on file   Food Insecurity: Not on file   Transportation Needs: Not on file   Physical Activity: Not on file   Stress: Not on file   Social Connections: Not on file   Intimate Partner Violence: Not on file   Housing Stability: Not on file        Current Outpatient Medications   Medication Sig Dispense Refill    pregabalin (LYRICA) 25 MG capsule Take 1 capsule by mouth 3 times daily. 60 capsule 3    DULoxetine (CYMBALTA) 60 MG extended release capsule Take 1 capsule by mouth daily 90 capsule 2    cetirizine (ZYRTEC) 10 MG tablet Take 10 mg by mouth daily       Budesonide (RHINOCORT ALLERGY NA) 1 spray by Nasal route 2 times daily       ferrous sulfate (IRON 325) 325 (65 Fe) MG tablet Take 325 mg by mouth daily (with breakfast)       No current facility-administered medications for this visit. Allergies   Allergen Reactions    Pecan Nut (Diagnostic) Itching, Rash and Shortness Of Breath    Flagyl [Metronidazole] Other (See Comments)     Confusion          REVIEW OF SYSTEMS:     Review of Systems   Constitutional:  Positive for fatigue. HENT: Negative. Eyes: Negative. Respiratory: Negative. Cardiovascular: Negative. Gastrointestinal: Negative. Endocrine: Negative. Genitourinary: Negative. Musculoskeletal:  Positive for back pain and gait problem. Skin: Negative. Neurological:  Positive for weakness and numbness. Negative for dizziness, tremors, seizures, syncope, facial asymmetry, speech difficulty, light-headedness and headaches. Hematological: Negative. Psychiatric/Behavioral:  Negative for sleep disturbance.          NEUROLOGICAL EXAMINATION:   VITALS  /85 (Site: Left Upper Arm, Position: Sitting, Cuff Size: Medium Adult)   Pulse 61   Ht 6' (1.829 m)   Wt 161 lb (73 kg)   BMI 21.84 kg/m²      Mental status    Alert and oriented x 3; intact memory with no confusion, speech or language problems; no hallucinations or delusions     Cranial nerves    II - visual fields intact to confrontation  III, IV, VI - extra-ocular muscles full: no pupillary defect; no LIAM, no nystagmus, no ptosis   V - normal facial sensation                                                               VII - normal facial symmetry                                                             VIII - intact hearing IX, X - symmetrical palate                                                                  XI - symmetrical shoulder shrug                                                       XII - tongue midline without atrophy or fasciculation      Motor function  Normal muscle bulk and tone; strength 4/5 in RHF otherwise 5/5 throughout. Sensory function Decreased vibration bilaterally to ankle, decreased touch to pinprick up to mid calf bilaterally. Proprioception intact      Cerebellar Intact fine motor movement. No involuntary movements or tremors. No ataxia or dysmetria on finger to nose or heel to shin testing      Reflex function DTR 2+ on bilateral UE and 3+ LE, symmetric. Gait                   normal base and arm swing        ASSESSMENT / PLAN:   Crow Tripp is a 28 y.o. male that established care with neurology for gait imbalance. 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 Discussed that his gait imbalance is likely due to neuropathy that is compounded by radiculopathy. He has noticed improvement with physical therapy. Discussed I will follow up on EMG once its been completed. Will refer to pain management and start Lyrica 25 mg 3 times a day for paresthesias. Discussed given physical therapy has helped with like patient to continue physical therapy for improved benefit. Patient was ordered a sleep titration study and recently completed it. We will follow-up on titration study and discussed he needs to start CPAP and reviewed measures to ensure compliance.       Roscoe Trujillo MD   Neurology & Sleep Medicine  [facility]

## 2022-11-08 ENCOUNTER — TELEPHONE (OUTPATIENT)
Dept: NEUROLOGY | Age: 32
End: 2022-11-08

## 2022-11-14 PROBLEM — G47.30 SLEEP APNEA: Status: ACTIVE | Noted: 2022-11-14

## 2022-11-14 LAB — STATUS: NORMAL

## 2022-11-14 PROCEDURE — 95811 POLYSOM 6/>YRS CPAP 4/> PARM: CPT | Performed by: STUDENT IN AN ORGANIZED HEALTH CARE EDUCATION/TRAINING PROGRAM

## 2022-11-14 NOTE — TELEPHONE ENCOUNTER
[id number]; Review Type:Prior Auth; Coverage Start Date:10/10/2022; Coverage End Date:11/12/2023    Patient notified.

## 2022-11-28 ENCOUNTER — INITIAL CONSULT (OUTPATIENT)
Dept: PAIN MANAGEMENT | Age: 32
End: 2022-11-28
Payer: COMMERCIAL

## 2022-11-28 VITALS — BODY MASS INDEX: 21.34 KG/M2 | HEIGHT: 73 IN | WEIGHT: 161 LBS

## 2022-11-28 DIAGNOSIS — R20.2 PARESTHESIA OF BILATERAL LEGS: ICD-10-CM

## 2022-11-28 DIAGNOSIS — M47.817 LUMBOSACRAL SPONDYLOSIS WITHOUT MYELOPATHY: Primary | ICD-10-CM

## 2022-11-28 PROCEDURE — G8420 CALC BMI NORM PARAMETERS: HCPCS | Performed by: PAIN MEDICINE

## 2022-11-28 PROCEDURE — G8484 FLU IMMUNIZE NO ADMIN: HCPCS | Performed by: PAIN MEDICINE

## 2022-11-28 PROCEDURE — G8427 DOCREV CUR MEDS BY ELIG CLIN: HCPCS | Performed by: PAIN MEDICINE

## 2022-11-28 PROCEDURE — 1036F TOBACCO NON-USER: CPT | Performed by: PAIN MEDICINE

## 2022-11-28 PROCEDURE — 99204 OFFICE O/P NEW MOD 45 MIN: CPT | Performed by: PAIN MEDICINE

## 2022-11-28 RX ORDER — DULOXETIN HYDROCHLORIDE 60 MG/1
1 CAPSULE, DELAYED RELEASE ORAL
COMMUNITY

## 2022-11-28 RX ORDER — CALCIUM CARBONATE/VITAMIN D3 600 MG-10
1 TABLET ORAL
COMMUNITY

## 2022-11-28 ASSESSMENT — ENCOUNTER SYMPTOMS
BOWEL INCONTINENCE: 0
BACK PAIN: 1

## 2022-11-28 NOTE — PROGRESS NOTES
Heart Attack Paternal Grandfather 80       Social History     Socioeconomic History    Marital status:      Spouse name: Not on file    Number of children: Not on file    Years of education: Not on file    Highest education level: Not on file   Occupational History    Not on file   Tobacco Use    Smoking status: Never    Smokeless tobacco: Never   Vaping Use    Vaping Use: Former    Quit date: 1/1/2016   Substance and Sexual Activity    Alcohol use: Yes     Comment: occasional    Drug use: Yes     Types: Marijuana Dietra Due)     Comment: socially    Sexual activity: Yes     Partners: Female     Comment: wife   Other Topics Concern    Not on file   Social History Narrative    Not on file     Social Determinants of Health     Financial Resource Strain: Not on file   Food Insecurity: Not on file   Transportation Needs: Not on file   Physical Activity: Not on file   Stress: Not on file   Social Connections: Not on file   Intimate Partner Violence: Not on file   Housing Stability: Not on file       Review of Systems:  Review of Systems   Musculoskeletal:  Positive for back pain. Gastrointestinal:  Negative for bowel incontinence. Genitourinary:  Negative for bladder incontinence. Physical Exam:    Physical Exam  Constitutional:       Appearance: Normal appearance. Pulmonary:      Effort: Pulmonary effort is normal.   Neurological:      Mental Status: He is alert. Psychiatric:         Attention and Perception: Attention and perception normal.         Mood and Affect: Mood and affect normal.       Record/Diagnostics Review:    As above, I did review the imaging    Orders:  Orders Placed This Encounter   Procedures    MRI THORACIC SPINE WO CONTRAST    MRI CERVICAL SPINE WO CONTRAST       Assessment:  1. Lumbosacral spondylosis without myelopathy    2.  Paresthesia of bilateral legs        Treatment Plan:  DISCUSSION: Treatment options discussed with patient and all questions answered to patient's satisfaction. OARRS Review: Reviewed and acceptable for medications prescribed. TREATMENT OPTIONS:   Discussed different treatment options including continued conservative care such as physical therapy, chiropractic care, acupuncture. Discussed different interventional options such as epidural steroids or medial branch blocks. Also discussed neuromodulation in the form of spinal cord stimulation. Also discussed surgical evaluation. Complaining of some subjective weakness in the lower extremities, I am uncertain MRI of his lumbar spine explains this, will obtain MRI of his cervical and thoracic spine to evaluate. EMG of the lower extremities pending. Deonte Cook M.D. I have reviewed the chief complaint and history of present illness (including ROS and PFSH) and vital documentation by my staff and I agree with their documentation and have added where applicable.

## 2022-11-29 ENCOUNTER — HOSPITAL ENCOUNTER (OUTPATIENT)
Dept: NEUROLOGY | Age: 32
Discharge: HOME | End: 2022-11-29
Payer: COMMERCIAL

## 2022-11-29 PROCEDURE — 95886 MUSC TEST DONE W/N TEST COMP: CPT

## 2022-11-29 PROCEDURE — 95911 NRV CNDJ TEST 9-10 STUDIES: CPT

## 2022-11-29 NOTE — PROCEDURES
[facility] [address]                 [address] [place] [address]                        EMG/NERVE CONDUCTION STUDIES REPORT      PATIENT NAME: Celeste Garcia                       :        1990  MED REC NO:   [id number]                             ROOM:  ACCOUNT NO:   [de-identified]                           ADMIT DATE: 2022      PROVIDER:     Eleonora Barone MD    DATE OF EM2022    REFERRING PROVIDER:  Fabian Vela MD    TECHNICAL SUMMARY:  The nature, purpose, goals, expectations and process  involved in the procedures of nerve conduction studies and needle  electromyography were reviewed, discussed, explained and verbal consent  was obtained from the patient. The patient's questions were answered  with reference to the above processes and procedures. There were no significant technical difficulties encountered during this  study and nerve conduction studies were performed under temperature  Monitoring. CLINICAL DATA:      The patient is 28years old with a history of tingling,  numbness involving both lower extremities, gait difficulties, balance  problems. The patient has chronic lower back pain radiating to both  lower extremities for more than 6 months. No history of diabetes. The  patient also feels sometimes his right lower extremity is worse dragging  feet. The purpose of the study is to evaluate for mononeuropathy,  radiculopathy, peripheral polyneuropathy. SUMMARY:      The sensory nerve action potentials of the right and left  sural and superficial peroneal nerves were within normal limits  bilaterally. Compound muscle action potentials of the right and left peroneal and  tibial nerve shows normal amplitude, distal latencies and borderline  conduction velocities. Proximal conductions as measured by the F responses were not recordable  in both peroneal and tibial nerves bilaterally.     Tibial H responses were not recordable bilaterally. Nerve  Conductions   Results  Were  Personally  Reviewed and  Analysed. Abnormal  Nerve  Conductions  Were  Personally  Repeated,  Verified, reconfirmed  And  Updated as needed  appropriately. Please    See   Wave  Forms   And    Details  Of     Nerve  Conduction   Studies   For  Additional  Information           Extensive   Needle  Electromyography  Was personally  Performed  In  Both       Lower  Extremities  In  The  Following  Muscles :      Gluteus  Medius,   Vastus  Lateralis,  Internal  Hamstring,    External  Hamstring,   Anterior Tibialis,  Medial  Gastrocnemius,          Extensive  Needle  Electromyography  Shows       A) Normal  insertional  Activity. There  Is  Absence  Of   P  Waves,  Fibrillations,  Fasciculations and        Other  Spontaneous   Activity. B) Voluntary  Motor unit  Potentials    Show :    Normal  Effort. Decreased   Recruitment, Increased amplitude &  Duration. Polyphasic features  Noted  In  The  Above  Examined  muscles            IMPRESSION:      1. There is electrodiagnostic evidence of chronic L4-L5 and L5-S1         radiculopathy bilaterally. No active denervation changes noted. 2.  Bilateral F and H responses were not recordable bilaterally. 3.  There is No definite electrodiagnostic evidence of peripheral          Polyneuropathy or  myopathy involving examined both lower        extremities. Further clinical correlation and followup recommended. Ashley Alexandre MD, [address] [place] [address]     Board Certified in Neurology  & in  [address] of Psychiatry and Neurology ([facility]).             D: 11/29/2022 15:41:24       T: 11/29/2022 16:16:34     [email or web address]  Job#: [id number]     Doc#: [id number]    CC:    Brad Mancilla APRN-CNP         Smooth Chamberlain MD

## 2022-12-02 ENCOUNTER — HOSPITAL ENCOUNTER (OUTPATIENT)
Dept: MRI IMAGING | Age: 32
End: 2022-12-02
Payer: COMMERCIAL

## 2022-12-02 ENCOUNTER — HOSPITAL ENCOUNTER (OUTPATIENT)
Dept: MRI IMAGING | Age: 32
Discharge: HOME | End: 2022-12-02
Payer: COMMERCIAL

## 2022-12-02 DIAGNOSIS — R20.2 PARESTHESIA OF BILATERAL LEGS: ICD-10-CM

## 2022-12-02 PROCEDURE — 72146 MRI CHEST SPINE W/O DYE: CPT

## 2022-12-02 PROCEDURE — 72141 MRI NECK SPINE W/O DYE: CPT

## 2022-12-20 ENCOUNTER — OFFICE VISIT (OUTPATIENT)
Dept: PAIN MANAGEMENT | Age: 32
End: 2022-12-20
Payer: COMMERCIAL

## 2022-12-20 VITALS — HEIGHT: 73 IN | WEIGHT: 161 LBS | BODY MASS INDEX: 21.34 KG/M2

## 2022-12-20 DIAGNOSIS — M47.817 LUMBOSACRAL SPONDYLOSIS WITHOUT MYELOPATHY: ICD-10-CM

## 2022-12-20 DIAGNOSIS — M54.42 CHRONIC BILATERAL LOW BACK PAIN WITH BILATERAL SCIATICA: Primary | ICD-10-CM

## 2022-12-20 DIAGNOSIS — M54.41 CHRONIC BILATERAL LOW BACK PAIN WITH BILATERAL SCIATICA: Primary | ICD-10-CM

## 2022-12-20 DIAGNOSIS — G89.29 CHRONIC BILATERAL LOW BACK PAIN WITH BILATERAL SCIATICA: Primary | ICD-10-CM

## 2022-12-20 PROCEDURE — 99214 OFFICE O/P EST MOD 30 MIN: CPT | Performed by: NURSE PRACTITIONER

## 2022-12-20 PROCEDURE — G8484 FLU IMMUNIZE NO ADMIN: HCPCS | Performed by: NURSE PRACTITIONER

## 2022-12-20 PROCEDURE — 1036F TOBACCO NON-USER: CPT | Performed by: NURSE PRACTITIONER

## 2022-12-20 PROCEDURE — G8420 CALC BMI NORM PARAMETERS: HCPCS | Performed by: NURSE PRACTITIONER

## 2022-12-20 PROCEDURE — G8427 DOCREV CUR MEDS BY ELIG CLIN: HCPCS | Performed by: NURSE PRACTITIONER

## 2022-12-20 ASSESSMENT — ENCOUNTER SYMPTOMS
CONSTIPATION: 0
BACK PAIN: 1
COUGH: 0
SHORTNESS OF BREATH: 0

## 2022-12-20 NOTE — PROGRESS NOTES
extended release capsule, 1 capsule, Disp: , Rfl:     pregabalin (LYRICA) 25 MG capsule, Take 1 capsule by mouth 3 times daily. , Disp: 60 capsule, Rfl: 3    DULoxetine (CYMBALTA) 60 MG extended release capsule, Take 1 capsule by mouth daily, Disp: 90 capsule, Rfl: 2    Family History   Problem Relation Age of Onset    Cancer Father         brain cancer in remission ([id number])    Other Father         ruptured gall bladder had removed    Diabetes Maternal Grandfather     Heart Attack Paternal Grandfather 80       Social History     Socioeconomic History    Marital status:      Spouse name: Not on file    Number of children: Not on file    Years of education: Not on file    Highest education level: Not on file   Occupational History    Not on file   Tobacco Use    Smoking status: Never    Smokeless tobacco: Never   Vaping Use    Vaping Use: Former    Quit date: 1/1/2016   Substance and Sexual Activity    Alcohol use: Yes     Comment: occasional    Drug use: Yes     Types: Marijuana Brand Eusebioo)     Comment: socially    Sexual activity: Yes     Partners: Female     Comment: wife   Other Topics Concern    Not on file   Social History Narrative    Not on file     Social Determinants of Health     Financial Resource Strain: Not on file   Food Insecurity: Not on file   Transportation Needs: Not on file   Physical Activity: Not on file   Stress: Not on file   Social Connections: Not on file   Intimate Partner Violence: Not on file   Housing Stability: Not on file       Review of Systems:  Review of Systems   Constitutional: Negative for chills and fever. Cardiovascular:  Negative for chest pain and palpitations. Respiratory:  Negative for cough and shortness of breath. Musculoskeletal:  Positive for back pain. Gastrointestinal:  Negative for constipation. Neurological:  Positive for tingling and weakness. Negative for disturbances in coordination, headaches, loss of balance and numbness.      Physical Exam:  Ht Relevant Orders    Lumbar Epidural Steroid Injection/Caudal          Treatment Plan:  Discussed different treatment options including continued conservative care such as physical therapy, chiropractic care, acupuncture. He is currently in PT with benefit  Discussed different interventional options such as epidural steroids or medial branch blocks. Pain in the low back and legs continues despite conservative measures, may benefit from epidural steroid injections  LESI x1  Follow up after procedure    I have reviewed the chief complaint and history of present illness (including ROS and PFSH) and vital documentation by my staff and I agree with their documentation and have added where applicable.

## 2023-02-16 ENCOUNTER — HOSPITAL ENCOUNTER (OUTPATIENT)
Dept: PAIN MANAGEMENT | Facility: CLINIC | Age: 33
Discharge: HOME | End: 2023-02-16

## 2023-02-16 NOTE — DISCHARGE INSTRUCTIONS

## 2023-03-01 ENCOUNTER — OFFICE VISIT (OUTPATIENT)
Dept: NEUROLOGY | Age: 33
End: 2023-03-01
Payer: COMMERCIAL

## 2023-03-01 VITALS
HEIGHT: 72 IN | DIASTOLIC BLOOD PRESSURE: 60 MMHG | WEIGHT: 154 LBS | SYSTOLIC BLOOD PRESSURE: 98 MMHG | BODY MASS INDEX: 20.86 KG/M2 | HEART RATE: 70 BPM

## 2023-03-01 DIAGNOSIS — G62.9 SENSORY NEUROPATHY: ICD-10-CM

## 2023-03-01 DIAGNOSIS — G47.33 OBSTRUCTIVE SLEEP APNEA SYNDROME: Primary | ICD-10-CM

## 2023-03-01 DIAGNOSIS — M54.40 LOW BACK PAIN WITH SCIATICA, SCIATICA LATERALITY UNSPECIFIED, UNSPECIFIED BACK PAIN LATERALITY, UNSPECIFIED CHRONICITY: ICD-10-CM

## 2023-03-01 DIAGNOSIS — R26.81 GAIT INSTABILITY: ICD-10-CM

## 2023-03-01 PROCEDURE — G8484 FLU IMMUNIZE NO ADMIN: HCPCS | Performed by: PSYCHIATRY & NEUROLOGY

## 2023-03-01 PROCEDURE — G8420 CALC BMI NORM PARAMETERS: HCPCS | Performed by: PSYCHIATRY & NEUROLOGY

## 2023-03-01 PROCEDURE — 1036F TOBACCO NON-USER: CPT | Performed by: PSYCHIATRY & NEUROLOGY

## 2023-03-01 PROCEDURE — G8427 DOCREV CUR MEDS BY ELIG CLIN: HCPCS | Performed by: PSYCHIATRY & NEUROLOGY

## 2023-03-01 PROCEDURE — 99215 OFFICE O/P EST HI 40 MIN: CPT | Performed by: PSYCHIATRY & NEUROLOGY

## 2023-03-01 ASSESSMENT — ENCOUNTER SYMPTOMS
GASTROINTESTINAL NEGATIVE: 1
ALLERGIC/IMMUNOLOGIC NEGATIVE: 1
BACK PAIN: 1
EYES NEGATIVE: 1
RESPIRATORY NEGATIVE: 1

## 2023-03-01 NOTE — PROGRESS NOTES
Active Problem sleep apnea to proceed with nasal CPAP titration along with gait imbalance and neuropathy having followed [facility] Dr Cristobal [place] last evaluated in November 2022 . The condition is he is using nasal CPAP on nightly basis seeing difference being more rested . He is going to bed at 10 PM to 12 AM falling asleep within 20 minutes . He will sleep to 6:35 AM .He will average using nasal CPAP for 5 to 6 hours in part do to awakening at night to tend to young baby . He reports that nasal CPAP pressure is too low feeling he is not getting enough air . The mask fit well with no leakage . He reports that when he walks he will sway towards one side more the right . When walking up steps he will feel off keel . He may loose footing with turning or getting dead leg with walking with dragging of right right foot . There is no falling . There is low back pan in mid low back area more with activity up to grade 6 over 10 being at rest grade 3 over 10 . There will be will tingling that will go down legs . There is urinary urgency . Significant medications lyrica 25 mg po qhs , iburofen PRN . Testing polysomnogram anea hyppnea index 8.1 episodes per hour with oxygen desaturation to 89 %, PLM 43/hr, September 2022  . CPAP 6 cm h20 . EMG/NCV with chronic L4-5 and L5-S1 radiculopathy bilaterally , November 2022 . MRI lumbar spine mild L5-S1 degenerative changes with disc bulge and facet hypertrophy causing mild bilateral L5 neural foramina narrowing. MRI of Head with single nonspecific right frontal T2 weighted intensity  . MRI cervical spine normal . MRI thoracic spine normal . Hga1c 4.8 , X66 829 , foliccaid 98.8 . SAVAGE negative , Anti SSA and SSB normal , B6 230 ()      Past Medical History:   Diagnosis Date    Irritable bowel syndrome     GI doctor in [place] was treating IBS       No past surgical history on file.     Family History   Problem Relation Age of Onset    Cancer Father         brain cancer in remission ([id number])    Other Father         ruptured gall bladder had removed    Diabetes Maternal Grandfather     Heart Attack Paternal Grandfather 80       Social History     Socioeconomic History    Marital status:      Spouse name: None    Number of children: None    Years of education: None    Highest education level: None   Tobacco Use    Smoking status: Never    Smokeless tobacco: Never   Vaping Use    Vaping Use: Former    Quit date: 1/1/2016   Substance and Sexual Activity    Alcohol use: Yes     Comment: occasional    Drug use: Yes     Types: Marijuana (Weed)     Comment: socially    Sexual activity: Yes     Partners: Female     Comment: wife       Current Outpatient Medications   Medication Sig Dispense Refill    Omega 3 1200 MG CAPS 1 capsule      DULoxetine (CYMBALTA) 60 MG extended release capsule 1 capsule      pregabalin (LYRICA) 25 MG capsule Take 1 capsule by mouth 3 times daily. (Patient taking differently: Take 25 mg by mouth 2 times daily.) 60 capsule 3     No current facility-administered medications for this visit. Allergies   Allergen Reactions    Pecan Nut (Diagnostic) Itching, Rash and Shortness Of Breath    Flagyl [Metronidazole] Other (See Comments)     Confusion           Review of Systems     Vitals:    03/01/23 0949   BP: 98/60   Pulse: 70     weight: 154 lb (69.9 kg)      Review of Systems   Constitutional: Negative. HENT: Negative. Eyes: Negative. Respiratory: Negative. Cardiovascular: Negative. Gastrointestinal: Negative. Endocrine: Negative. Genitourinary: Negative. Musculoskeletal:  Positive for back pain and gait problem. Skin: Negative. Allergic/Immunologic: Negative. Neurological:  Positive for numbness. Hematological: Negative. Psychiatric/Behavioral: Negative. Neurological Examination  Constitutional .General exam well groomed   Head/Ears /Nose/Throat: external ear . Normal exam  Neck and thyroid . Normal size.  No bruits  Respiratory . Breathsounds clear bilaterally  Cardiovascular: Auscultation of heart with regular rate and rhythm  Musculoskeletal. Muscle bulk and tone normal                                                           Muscle strength 5/5 strength throughout                                                                                No dysmetria or dysdiadokinesis  No tremor   Normal fine motor  Gait imbalance unable to tandem  Orientation Alert and oriented x 3   Attention and concentration normal  Short term memory normal  Language process and speech normal . No aphasia   Cranial nerve 2 normal acuety and visual fields  Cranial nerve 3, 4 and 6 . Extraocular muscles are intact . Pupils are equal and reactive   Cranial nerve 5 . Intact corneal reflex. Normal facial sensation  Cranial nerve 7 normal exam   Cranial nerve 8. Grossly intact hearing   Cranial nerve 9 and 10. Symmetric palate elevation   Cranial nerve 11 , 5 out of 5 strength   Cranial Nerve 12 midline tongue . No atrophy  Sensation . Normal proprioception . Deccrease vibration at toe level . Decrease pinprick and light touch in stocking level    Deep Tendon Reflexes normal  Plantar response flexor bilaterally      ASSESSMENT/PLAN      Diagnosis Orders   1. Obstructive sleep apnea syndrome  CPAP treatment - face mask      2. Sensory neuropathy        3. Gait instability        4. Low back pain with sciatica, sciatica laterality unspecified, unspecified back pain laterality, unspecified chronicity        Patient is using nasal CPAP seeing clear benefit . Will adjust nasal CPAP pressure to 8 cm H20 with air hunger . There is gait instability with clinical sensory neuropathy . Would do serial spine and head imaging and even consider LP to rule other causes such as primary progressive mutiple sclerosis .  He is to followup with Dr Ascencion Horton This Encounter   Procedures    CPAP treatment - face mask     Standing Status:   Future     Standing Expiration Date:   2/29/2024     Scheduling Instructions:      Increase CPAP 8 cmH20

## 2023-05-02 ENCOUNTER — TELEPHONE (OUTPATIENT)
Dept: FAMILY MEDICINE CLINIC | Age: 33
End: 2023-05-02

## 2023-05-02 NOTE — TELEPHONE ENCOUNTER
----- Message from Jacquelin Jointer sent at 5/2/2023 10:18 AM EDT -----  Subject: Referral Request    Reason for referral request? Patient would like to get a referral to a   specialist for a vasectomy. Please advise. Provider patient wants to be referred to(if known):     Provider Phone Number(if known):     Additional Information for Provider?   ---------------------------------------------------------------------------  --------------  [address] [employer]    [phone]; OK to leave message on voicemail  ---------------------------------------------------------------------------  --------------

## 2023-05-02 NOTE — TELEPHONE ENCOUNTER
----- Message from Hansel Saenz sent at 5/2/2023 10:18 AM EDT -----  Subject: Referral Request    Reason for referral request? Patient would like to get a referral to a   specialist for a vasectomy. Please advise. Provider patient wants to be referred to(if known):     Provider Phone Number(if known):     Additional Information for Provider?   ---------------------------------------------------------------------------  --------------  [address] [facility]    [phone]; OK to leave message on voicemail  ---------------------------------------------------------------------------  --------------

## 2023-06-01 ENCOUNTER — OFFICE VISIT (OUTPATIENT)
Dept: FAMILY MEDICINE CLINIC | Age: 33
End: 2023-06-01

## 2023-06-01 VITALS
BODY MASS INDEX: 21.84 KG/M2 | OXYGEN SATURATION: 99 % | SYSTOLIC BLOOD PRESSURE: 112 MMHG | DIASTOLIC BLOOD PRESSURE: 60 MMHG | WEIGHT: 161 LBS | HEART RATE: 55 BPM

## 2023-06-01 DIAGNOSIS — R53.83 FATIGUE, UNSPECIFIED TYPE: ICD-10-CM

## 2023-06-01 DIAGNOSIS — Z30.09 VASECTOMY EVALUATION: Primary | ICD-10-CM

## 2023-06-01 DIAGNOSIS — R26.9 ABNORMAL GAIT: ICD-10-CM

## 2023-06-01 DIAGNOSIS — G47.30 SLEEP APNEA, UNSPECIFIED TYPE: ICD-10-CM

## 2023-06-01 DIAGNOSIS — R61 CHRONIC NIGHT SWEATS: ICD-10-CM

## 2023-06-01 PROBLEM — R20.2 NUMBNESS AND TINGLING OF LOWER EXTREMITY: Status: ACTIVE | Noted: 2022-08-01

## 2023-06-01 PROBLEM — R20.0 NUMBNESS AND TINGLING OF LOWER EXTREMITY: Status: ACTIVE | Noted: 2022-08-01

## 2023-06-01 ASSESSMENT — PATIENT HEALTH QUESTIONNAIRE - PHQ9
SUM OF ALL RESPONSES TO PHQ QUESTIONS 1-9: 0
2. FEELING DOWN, DEPRESSED OR HOPELESS: 0
SUM OF ALL RESPONSES TO PHQ QUESTIONS 1-9: 0
SUM OF ALL RESPONSES TO PHQ QUESTIONS 1-9: 0
SUM OF ALL RESPONSES TO PHQ9 QUESTIONS 1 & 2: 0
1. LITTLE INTEREST OR PLEASURE IN DOING THINGS: 0
SUM OF ALL RESPONSES TO PHQ QUESTIONS 1-9: 0

## 2023-06-11 ASSESSMENT — ENCOUNTER SYMPTOMS
SHORTNESS OF BREATH: 0
WHEEZING: 0

## 2023-11-14 ENCOUNTER — TELEPHONE (OUTPATIENT)
Dept: NEUROLOGY | Age: 33
End: 2023-11-14

## 2023-11-14 NOTE — TELEPHONE ENCOUNTER
Patient called into the office requesting that his CPAP Order be faxed to Psychiatric at [phone]. Order faxed.

## 2025-06-16 ASSESSMENT — PATIENT HEALTH QUESTIONNAIRE - PHQ9
SUM OF ALL RESPONSES TO PHQ QUESTIONS 1-9: 0
SUM OF ALL RESPONSES TO PHQ9 QUESTIONS 1 & 2: 0
SUM OF ALL RESPONSES TO PHQ QUESTIONS 1-9: 0
1. LITTLE INTEREST OR PLEASURE IN DOING THINGS: NOT AT ALL
SUM OF ALL RESPONSES TO PHQ QUESTIONS 1-9: 0
2. FEELING DOWN, DEPRESSED OR HOPELESS: NOT AT ALL
2. FEELING DOWN, DEPRESSED OR HOPELESS: NOT AT ALL
SUM OF ALL RESPONSES TO PHQ QUESTIONS 1-9: 0
1. LITTLE INTEREST OR PLEASURE IN DOING THINGS: NOT AT ALL

## 2025-06-19 ENCOUNTER — OFFICE VISIT (OUTPATIENT)
Dept: FAMILY MEDICINE CLINIC | Age: 35
End: 2025-06-19
Payer: COMMERCIAL

## 2025-06-19 VITALS
HEART RATE: 54 BPM | OXYGEN SATURATION: 99 % | RESPIRATION RATE: 18 BRPM | BODY MASS INDEX: 21.37 KG/M2 | TEMPERATURE: 97.4 F | SYSTOLIC BLOOD PRESSURE: 128 MMHG | WEIGHT: 157.8 LBS | HEIGHT: 72 IN | DIASTOLIC BLOOD PRESSURE: 84 MMHG

## 2025-06-19 DIAGNOSIS — M54.16 RADICULOPATHY, LUMBAR REGION: ICD-10-CM

## 2025-06-19 DIAGNOSIS — G47.30 SLEEP APNEA, UNSPECIFIED TYPE: ICD-10-CM

## 2025-06-19 DIAGNOSIS — L29.9 EAR ITCHING: ICD-10-CM

## 2025-06-19 DIAGNOSIS — Z00.00 ENCOUNTER FOR WELLNESS EXAMINATION IN ADULT: Primary | ICD-10-CM

## 2025-06-19 DIAGNOSIS — B35.6 TINEA CRURIS: ICD-10-CM

## 2025-06-19 DIAGNOSIS — R53.83 FATIGUE, UNSPECIFIED TYPE: ICD-10-CM

## 2025-06-19 PROBLEM — R20.2 NUMBNESS AND TINGLING OF LOWER EXTREMITY: Status: RESOLVED | Noted: 2022-08-01 | Resolved: 2025-06-19

## 2025-06-19 PROBLEM — R20.0 NUMBNESS AND TINGLING OF LOWER EXTREMITY: Status: RESOLVED | Noted: 2022-08-01 | Resolved: 2025-06-19

## 2025-06-19 PROBLEM — R29.90 ABNORMAL NEUROLOGICAL EXAM: Status: RESOLVED | Noted: 2022-08-08 | Resolved: 2025-06-19

## 2025-06-19 PROBLEM — R61 CHRONIC NIGHT SWEATS: Status: RESOLVED | Noted: 2021-10-23 | Resolved: 2025-06-19

## 2025-06-19 PROCEDURE — 99395 PREV VISIT EST AGE 18-39: CPT | Performed by: NURSE PRACTITIONER

## 2025-06-19 RX ORDER — METHYLPREDNISOLONE 4 MG/1
TABLET ORAL
Qty: 1 KIT | Refills: 0 | Status: SHIPPED | OUTPATIENT
Start: 2025-06-19 | End: 2025-06-25

## 2025-06-19 RX ORDER — TRIAMCINOLONE ACETONIDE 1 MG/G
CREAM TOPICAL 2 TIMES DAILY
Qty: 45 G | Refills: 0 | Status: SHIPPED | OUTPATIENT
Start: 2025-06-19

## 2025-06-19 RX ORDER — CLOTRIMAZOLE 1 %
CREAM (GRAM) TOPICAL
Qty: 30 G | Refills: 1 | Status: SHIPPED | OUTPATIENT
Start: 2025-06-19

## 2025-06-19 SDOH — ECONOMIC STABILITY: FOOD INSECURITY: WITHIN THE PAST 12 MONTHS, THE FOOD YOU BOUGHT JUST DIDN'T LAST AND YOU DIDN'T HAVE MONEY TO GET MORE.: NEVER TRUE

## 2025-06-19 SDOH — ECONOMIC STABILITY: FOOD INSECURITY: WITHIN THE PAST 12 MONTHS, YOU WORRIED THAT YOUR FOOD WOULD RUN OUT BEFORE YOU GOT MONEY TO BUY MORE.: NEVER TRUE

## 2025-06-19 NOTE — PROGRESS NOTES
for 20 minutes before golfing, which they believe has been beneficial. They resumed golfing at the end of 03/2025 and have played a few rounds this year. Tightness in the lower back is reported, but no spasms. Numbness and tingling in the legs are described as a minor sensation of the leg being asleep. They have previously consulted a neurologist and undergone an EMG test at [facility] but have not had a recent follow-up. Exercises prescribed during physical therapy are continued, but no improvement has been noticed.    They have seasonal allergies and started taking Zyrtec again about 2 months ago. They do not take it in the wintertime. They still get a rash on their face that comes and goes.    PAST SURGICAL HISTORY:  Vasectomy      BP Readings from Last 3 Encounters:   06/19/25 128/84   06/01/23 112/60   03/01/23 98/60            Wt Readings from Last 3 Encounters:   06/19/25 71.6 kg (157 lb 12.8 oz)   06/01/23 73 kg (161 lb)   03/01/23 69.9 kg (154 lb)        Past Medical History:   Diagnosis Date    Headache 07/24/22    Headache similar to when I had a concussion (head feels too small for brain)    Irritable bowel syndrome     GI doctor in [place] was treating IBS    Neuropathy     Sleep difficulties 11/1/21    Night sweats        Current Outpatient Medications   Medication Sig Dispense Refill    triamcinolone (KENALOG) 0.1 % cream Apply topically 2 times daily 45 g 0    clotrimazole (LOTRIMIN) 1 % cream Apply topically 2 times daily. 30 g 1    Omega 3 1200 MG CAPS 1 capsule (Patient not taking: Reported on 6/19/2025)      DULoxetine (CYMBALTA) 60 MG extended release capsule 1 capsule (Patient not taking: Reported on 6/19/2025)      pregabalin (LYRICA) 25 MG capsule Take 1 capsule by mouth 3 times daily. (Patient taking differently: Take 1 capsule by mouth 2 times daily.) 60 capsule 3     No current facility-administered medications for this visit.     Allergies   Allergen Reactions    Pecan Nut

## 2025-06-19 NOTE — ASSESSMENT & PLAN NOTE
Patient reports continued use of CPAP therapy nightly. States improved sleep quality, reduced daytime fatigue, and fewer apneic symptoms. No significant side effects reported from the device. Denies mask leaks or difficulty tolerating pressure settings.

## 2025-06-23 ENCOUNTER — HOSPITAL ENCOUNTER (OUTPATIENT)
Age: 35
Discharge: HOME | End: 2025-06-23
Payer: COMMERCIAL

## 2025-06-23 DIAGNOSIS — G47.30 SLEEP APNEA, UNSPECIFIED TYPE: ICD-10-CM

## 2025-06-23 DIAGNOSIS — Z00.00 ENCOUNTER FOR WELLNESS EXAMINATION IN ADULT: ICD-10-CM

## 2025-06-23 DIAGNOSIS — R53.83 FATIGUE, UNSPECIFIED TYPE: ICD-10-CM

## 2025-06-23 LAB
ANION GAP SERPL CALC-SCNC: 10 MEQ/L (ref 8–16)
BASOPHILS ABSOLUTE: 0.1 THOU/MM3 (ref 0–0.1)
BASOPHILS NFR BLD AUTO: 2 %
BUN SERPL-MCNC: 12 MG/DL (ref 8–23)
CALCIUM SERPL-MCNC: 9 MG/DL (ref 8.6–10)
CHLORIDE SERPL-SCNC: 102 MEQ/L (ref 98–111)
CHOLESTEROL, FASTING: 141 MG/DL (ref 100–199)
CO2 SERPL-SCNC: 27 MEQ/L (ref 22–29)
CREAT SERPL-MCNC: 1 MG/DL (ref 0.7–1.2)
CREAT UR-MCNC: 223 MG/DL
DEPRECATED RDW RBC AUTO: 47.9 FL (ref 35–45)
EOSINOPHIL NFR BLD AUTO: 3.2 %
EOSINOPHILS ABSOLUTE: 0.1 THOU/MM3 (ref 0–0.4)
ERYTHROCYTE [DISTWIDTH] IN BLOOD BY AUTOMATED COUNT: 13.6 % (ref 11.5–14.5)
GFR SERPL CREATININE-BSD FRML MDRD: > 90 ML/MIN/1.73M2
GLUCOSE SERPL-MCNC: 91 MG/DL (ref 74–109)
HCT VFR BLD AUTO: 42.1 % (ref 42–52)
HDLC SERPL-MCNC: 71 MG/DL
HGB BLD-MCNC: 14.1 GM/DL (ref 14–18)
IMM GRANULOCYTES # BLD AUTO: 0.01 THOU/MM3 (ref 0–0.07)
IMM GRANULOCYTES NFR BLD AUTO: 0.2 %
LDLC SERPL CALC-MCNC: 56 MG/DL
LYMPHOCYTES ABSOLUTE: 1.2 THOU/MM3 (ref 1–4.8)
LYMPHOCYTES NFR BLD AUTO: 26.9 %
MCH RBC QN AUTO: 31.8 PG (ref 26–33)
MCHC RBC AUTO-ENTMCNC: 33.5 GM/DL (ref 32.2–35.5)
MCV RBC AUTO: 94.8 FL (ref 80–94)
MICROALBUMIN UR-MCNC: < 2 MG/DL
MICROALBUMIN/CREAT RATIO PNL UR: 9 MG/G (ref 0–30)
MONOCYTES ABSOLUTE: 0.5 THOU/MM3 (ref 0.4–1.3)
MONOCYTES NFR BLD AUTO: 10.6 %
NEUTROPHILS ABSOLUTE: 2.5 THOU/MM3 (ref 1.8–7.7)
NEUTROPHILS NFR BLD AUTO: 57.1 %
NRBC BLD AUTO-RTO: 0 /100 WBC
PLATELET # BLD AUTO: 226 THOU/MM3 (ref 130–400)
PMV BLD AUTO: 10.5 FL (ref 9.4–12.4)
POTASSIUM SERPL-SCNC: 4.2 MEQ/L (ref 3.5–5.2)
RBC # BLD AUTO: 4.44 MILL/MM3 (ref 4.7–6.1)
SODIUM SERPL-SCNC: 139 MEQ/L (ref 135–145)
TRIGLYCERIDE, FASTING: 71 MG/DL (ref 0–199)
TSH SERPL DL<=0.05 MIU/L-ACNC: 1.07 UIU/ML (ref 0.27–4.2)
WBC # BLD AUTO: 4.4 THOU/MM3 (ref 4.8–10.8)

## 2025-06-23 PROCEDURE — 84443 ASSAY THYROID STIM HORMONE: CPT

## 2025-06-23 PROCEDURE — 36415 COLL VENOUS BLD VENIPUNCTURE: CPT

## 2025-06-23 PROCEDURE — 82043 UR ALBUMIN QUANTITATIVE: CPT

## 2025-06-23 PROCEDURE — 80061 LIPID PANEL: CPT

## 2025-06-23 PROCEDURE — 80048 BASIC METABOLIC PNL TOTAL CA: CPT

## 2025-06-23 PROCEDURE — 85025 COMPLETE CBC W/AUTO DIFF WBC: CPT

## 2025-06-29 ENCOUNTER — RESULTS FOLLOW-UP (OUTPATIENT)
Dept: FAMILY MEDICINE CLINIC | Age: 35
End: 2025-06-29

## 2025-07-17 ENCOUNTER — OFFICE VISIT (OUTPATIENT)
Dept: FAMILY MEDICINE CLINIC | Age: 35
End: 2025-07-17
Payer: COMMERCIAL

## 2025-07-17 VITALS
HEART RATE: 50 BPM | OXYGEN SATURATION: 98 % | BODY MASS INDEX: 21.15 KG/M2 | DIASTOLIC BLOOD PRESSURE: 62 MMHG | SYSTOLIC BLOOD PRESSURE: 116 MMHG | WEIGHT: 156 LBS

## 2025-07-17 DIAGNOSIS — R42 VERTIGO: Primary | ICD-10-CM

## 2025-07-17 PROCEDURE — 99213 OFFICE O/P EST LOW 20 MIN: CPT

## 2025-07-17 RX ORDER — MECLIZINE HYDROCHLORIDE 25 MG/1
25 TABLET ORAL 3 TIMES DAILY PRN
Qty: 15 TABLET | Refills: 0 | Status: SHIPPED | OUTPATIENT
Start: 2025-07-17 | End: 2025-07-27

## 2025-07-17 NOTE — PROGRESS NOTES
[facility]  [address] [place] [address]  Dept: [phone]    Date of Service:  7/17/2025    Khanh Mullen is a 34 y.o. male who presents in office today with Self.    Chief Complaint   Patient presents with    Dizziness     Patient has been getting dizzy and tremors for the last week.         Diagnoses / Plan:   1. Vertigo  -     meclizine (ANTIVERT) 25 MG tablet; Take 1 tablet by mouth 3 times daily as needed for Dizziness, Disp-15 tablet, R-0Normal  Vertigo as discussed, utilize meclizine as needed, plenty of oral hydration, follow-up in the office if no improvement or worsening symptoms    Medication sent to the pharmacy.  Discussed medication desired effects, potential side effects, and how to take the medication.  Encouraged symptomatic treatment, rest, increase oral fluid intake.  Follow-up for worsening or persistent symptoms.  Patient verbalizes understanding regarding plan of care and all questions answered.    Return if symptoms worsen or fail to improve.     Subjective:   History of Present Illness:  - Howard is here today for some dizziness.  This started a couple days ago, he did have some diarrhea over the weekend.  He is attempting to stay hydrated with water.  He is not using any over-the-counter medications.  He is not having any diarrhea today, dizziness is worse with positional changes from lying or sitting to standing.  However, no cough or congestion today.  He does need work note.      Current Outpatient Medications   Medication Sig Dispense Refill    triamcinolone (KENALOG) 0.1 % cream Apply topically 2 times daily 45 g 0    clotrimazole (LOTRIMIN) 1 % cream Apply topically 2 times daily. 30 g 1    meclizine (ANTIVERT) 25 MG tablet Take 1 tablet by mouth 3 times daily as needed for Dizziness 15 tablet 0    Omega 3 1200 MG CAPS 1 capsule (Patient not taking: Reported on 7/17/2025)      DULoxetine (CYMBALTA) 60 MG extended release capsule 1 capsule (Patient